# Patient Record
Sex: FEMALE | Race: WHITE | Employment: OTHER | URBAN - METROPOLITAN AREA
[De-identification: names, ages, dates, MRNs, and addresses within clinical notes are randomized per-mention and may not be internally consistent; named-entity substitution may affect disease eponyms.]

---

## 2017-07-26 PROBLEM — M25.562 CHRONIC PAIN OF BOTH KNEES: Status: ACTIVE | Noted: 2017-07-26

## 2017-07-26 PROBLEM — M25.561 CHRONIC PAIN OF BOTH KNEES: Status: ACTIVE | Noted: 2017-07-26

## 2017-07-26 PROBLEM — G89.29 CHRONIC PAIN OF BOTH KNEES: Status: ACTIVE | Noted: 2017-07-26

## 2017-07-26 PROBLEM — M25.552 LEFT HIP PAIN: Status: ACTIVE | Noted: 2017-07-26

## 2017-08-31 PROCEDURE — 83970 ASSAY OF PARATHORMONE: CPT | Performed by: FAMILY MEDICINE

## 2017-08-31 PROCEDURE — 82330 ASSAY OF CALCIUM: CPT | Performed by: FAMILY MEDICINE

## 2017-11-14 ENCOUNTER — HOSPITAL ENCOUNTER (OUTPATIENT)
Facility: HOSPITAL | Age: 67
Setting detail: HOSPITAL OUTPATIENT SURGERY
Discharge: HOME | End: 2017-11-14
Attending: SURGERY | Admitting: SURGERY
Payer: MEDICARE

## 2017-11-14 ENCOUNTER — ANESTHESIA (OUTPATIENT)
Dept: SURGERY | Facility: HOSPITAL | Age: 67
End: 2017-11-14
Payer: MEDICARE

## 2017-11-14 ENCOUNTER — SURGERY (OUTPATIENT)
Age: 67
End: 2017-11-14

## 2017-11-14 ENCOUNTER — ANESTHESIA EVENT (OUTPATIENT)
Dept: SURGERY | Facility: HOSPITAL | Age: 67
End: 2017-11-14
Payer: MEDICARE

## 2017-11-14 VITALS
DIASTOLIC BLOOD PRESSURE: 68 MMHG | RESPIRATION RATE: 20 BRPM | OXYGEN SATURATION: 98 % | SYSTOLIC BLOOD PRESSURE: 99 MMHG | WEIGHT: 139 LBS | HEIGHT: 63.5 IN | HEART RATE: 86 BPM | TEMPERATURE: 98 F | BODY MASS INDEX: 24.32 KG/M2

## 2017-11-14 PROCEDURE — 88305 TISSUE EXAM BY PATHOLOGIST: CPT | Performed by: SURGERY

## 2017-11-14 PROCEDURE — 83970 ASSAY OF PARATHORMONE: CPT | Performed by: SURGERY

## 2017-11-14 PROCEDURE — 0GBM0ZZ EXCISION OF LEFT SUPERIOR PARATHYROID GLAND, OPEN APPROACH: ICD-10-PCS | Performed by: SURGERY

## 2017-11-14 RX ORDER — SODIUM CHLORIDE, SODIUM LACTATE, POTASSIUM CHLORIDE, CALCIUM CHLORIDE 600; 310; 30; 20 MG/100ML; MG/100ML; MG/100ML; MG/100ML
INJECTION, SOLUTION INTRAVENOUS CONTINUOUS
Status: DISCONTINUED | OUTPATIENT
Start: 2017-11-14 | End: 2017-11-14

## 2017-11-14 RX ORDER — IPRATROPIUM BROMIDE 42 UG/1
1-2 SPRAY, METERED NASAL 2 TIMES DAILY
Status: ON HOLD | COMMUNITY
End: 2017-11-14

## 2017-11-14 RX ORDER — IBUPROFEN 800 MG/1
800 TABLET ORAL EVERY 8 HOURS PRN
Qty: 10 TABLET | Refills: 1 | Status: SHIPPED | OUTPATIENT
Start: 2017-11-14 | End: 2018-01-13

## 2017-11-14 RX ORDER — HYDROMORPHONE HYDROCHLORIDE 1 MG/ML
INJECTION, SOLUTION INTRAMUSCULAR; INTRAVENOUS; SUBCUTANEOUS
Status: COMPLETED
Start: 2017-11-14 | End: 2017-11-14

## 2017-11-14 RX ORDER — METOCLOPRAMIDE HYDROCHLORIDE 5 MG/ML
10 INJECTION INTRAMUSCULAR; INTRAVENOUS AS NEEDED
Status: DISCONTINUED | OUTPATIENT
Start: 2017-11-14 | End: 2017-11-14

## 2017-11-14 RX ORDER — HYDROCODONE BITARTRATE AND ACETAMINOPHEN 5; 325 MG/1; MG/1
1 TABLET ORAL AS NEEDED
Status: DISCONTINUED | OUTPATIENT
Start: 2017-11-14 | End: 2017-11-14

## 2017-11-14 RX ORDER — BUPIVACAINE HYDROCHLORIDE 5 MG/ML
INJECTION, SOLUTION EPIDURAL; INTRACAUDAL AS NEEDED
Status: DISCONTINUED | OUTPATIENT
Start: 2017-11-14 | End: 2017-11-14 | Stop reason: HOSPADM

## 2017-11-14 RX ORDER — ONDANSETRON 2 MG/ML
4 INJECTION INTRAMUSCULAR; INTRAVENOUS AS NEEDED
Status: DISCONTINUED | OUTPATIENT
Start: 2017-11-14 | End: 2017-11-14

## 2017-11-14 RX ORDER — FLUTICASONE PROPIONATE 50 MCG
1 SPRAY, SUSPENSION (ML) NASAL 2 TIMES DAILY
COMMUNITY
End: 2018-07-02

## 2017-11-14 RX ORDER — HYDROCODONE BITARTRATE AND ACETAMINOPHEN 5; 325 MG/1; MG/1
1-2 TABLET ORAL EVERY 4 HOURS PRN
Qty: 10 TABLET | Refills: 0 | Status: SHIPPED | OUTPATIENT
Start: 2017-11-14 | End: 2018-01-24 | Stop reason: ALTCHOICE

## 2017-11-14 RX ORDER — ACETAMINOPHEN 500 MG
1000 TABLET ORAL ONCE AS NEEDED
Status: DISCONTINUED | OUTPATIENT
Start: 2017-11-14 | End: 2017-11-14

## 2017-11-14 RX ORDER — HYDROMORPHONE HYDROCHLORIDE 1 MG/ML
0.4 INJECTION, SOLUTION INTRAMUSCULAR; INTRAVENOUS; SUBCUTANEOUS EVERY 5 MIN PRN
Status: DISCONTINUED | OUTPATIENT
Start: 2017-11-14 | End: 2017-11-14

## 2017-11-14 RX ORDER — MEPERIDINE HYDROCHLORIDE 25 MG/ML
12.5 INJECTION INTRAMUSCULAR; INTRAVENOUS; SUBCUTANEOUS AS NEEDED
Status: DISCONTINUED | OUTPATIENT
Start: 2017-11-14 | End: 2017-11-14

## 2017-11-14 RX ORDER — ALENDRONATE SODIUM 70 MG/1
70 TABLET ORAL WEEKLY
COMMUNITY
End: 2017-11-24

## 2017-11-14 RX ORDER — DEXTROSE, SODIUM CHLORIDE, SODIUM LACTATE, POTASSIUM CHLORIDE, AND CALCIUM CHLORIDE 5; .6; .31; .03; .02 G/100ML; G/100ML; G/100ML; G/100ML; G/100ML
INJECTION, SOLUTION INTRAVENOUS CONTINUOUS
Status: DISCONTINUED | OUTPATIENT
Start: 2017-11-14 | End: 2017-11-14

## 2017-11-14 RX ORDER — HYDROCODONE BITARTRATE AND ACETAMINOPHEN 5; 325 MG/1; MG/1
2 TABLET ORAL AS NEEDED
Status: DISCONTINUED | OUTPATIENT
Start: 2017-11-14 | End: 2017-11-14

## 2017-11-14 RX ORDER — NALOXONE HYDROCHLORIDE 0.4 MG/ML
80 INJECTION, SOLUTION INTRAMUSCULAR; INTRAVENOUS; SUBCUTANEOUS AS NEEDED
Status: DISCONTINUED | OUTPATIENT
Start: 2017-11-14 | End: 2017-11-14

## 2017-11-14 NOTE — H&P
[facility]  Report of history and physical    Trula Snooks Patient Status:  Outpatient in a Bed    1950 MRN [id number]   Location [facility] Attending Henrique Calvillo MD   Hosp Day # 0 PCP Ayush Flores MD     Reason for nausea, vomiting, constipation, diarrhea; no rectal bleeding; no heartburn  GENITAL/: no dysuria, urgency or frequency, no tea colored urine  MUSCULOSKELETAL: no joint complaints upper or lower extremities  HEMATOLOGY: denies hx anemia; denies bruising o mineral density of the left femoral neck is 0.635 g/cm2, with a T-score of -1.9  and a Z-score of -0.3.  This corresponds to [facility] classification of osteopenia.     The total average bone mineral density of the left hip is 0.692 g/cm2, with a T-score of -2.1

## 2017-11-14 NOTE — BRIEF OP NOTE
Pre-Operative Diagnosis: HYPERPARATHYROIDISM       Post-Operative Diagnosis: HYPERPARATHYROIDISM       Procedure Performed:   PARATHYROIDECTOMY WITH INTRA OPERATIVE ULTRASOUND AND INTACT PARATHYROID HORMONE ASSAY    Surgeon(s) and Role:     Mary Tubbs

## 2017-11-14 NOTE — ANESTHESIA POSTPROCEDURE EVALUATION
[address] Patient Status:  Outpatient in a Bed   Age/Gender 75year old female MRN [id number]   [facility] SURGERY Attending Elgin Carr MD   Hosp Day # 0 PCP Oumar Edward MD       Anesthesia Post-op Note    Proc

## 2017-11-14 NOTE — ANESTHESIA PREPROCEDURE EVALUATION
PRE-OP EVALUATION    Patient Name: Negar Gonzalez    Pre-op Diagnosis: HYPERPARATHYROIDISM      Procedure(s):  PARATHYROIDECTOMY WITH INTRA OPERATIVE ULTRASOUND AND INTACT PARATHYROID HORMONE ASSAY, NERVE MONITORING      Surgeon(s) and Role:     Jean Sprankle Mills, Drug use: No     Available pre-op labs reviewed. Airway      Mallampati: II  Mouth opening: 3 FB  TM distance: 4 - 6 cm  Neck ROM: full Cardiovascular    Cardiovascular exam normal.         Dental    No notable dental history.          P

## 2017-11-14 NOTE — OR NURSING
Pt has Dr. Donald Arguelles personal instruction sheet with the prescription attached for discharge from his office.

## 2017-11-15 NOTE — OPERATIVE REPORT
[address]    PATIENT'S NAME: Mamadou Law   ATTENDING PHYSICIAN: Phuc Dyer M.D. OPERATING PHYSICIAN: Phuc Dyer M.D.    PATIENT ACCOUNT#:   [de-identified]    LOCATION:  PACU [facility] PACU 1 [facility]  MEDICAL RECORD #:   [id number]       DATE OF BIRTH:  [birth year] After IV sedation and endotracheal intubation, the patient's arms were tucked at her side and a roll was positioned under the shoulder blade. The neck was slightly hyperextended. Then, ultrasound was used.   The area was scanned, and I was able to visuali 13:28:20  [facility] [id number]  TL/

## 2018-01-29 PROCEDURE — 86803 HEPATITIS C AB TEST: CPT | Performed by: FAMILY MEDICINE

## 2018-09-26 ENCOUNTER — ANESTHESIA EVENT (OUTPATIENT)
Dept: ENDOSCOPY | Facility: HOSPITAL | Age: 68
End: 2018-09-26

## 2018-09-26 ENCOUNTER — ANESTHESIA (OUTPATIENT)
Dept: ENDOSCOPY | Facility: HOSPITAL | Age: 68
End: 2018-09-26

## 2018-09-26 ENCOUNTER — HOSPITAL ENCOUNTER (OUTPATIENT)
Facility: HOSPITAL | Age: 68
Setting detail: HOSPITAL OUTPATIENT SURGERY
Discharge: HOME | End: 2018-09-26
Attending: INTERNAL MEDICINE | Admitting: INTERNAL MEDICINE
Payer: MEDICARE

## 2018-09-26 VITALS
WEIGHT: 138 LBS | HEART RATE: 91 BPM | TEMPERATURE: 99 F | BODY MASS INDEX: 24.15 KG/M2 | SYSTOLIC BLOOD PRESSURE: 110 MMHG | OXYGEN SATURATION: 94 % | HEIGHT: 63.5 IN | RESPIRATION RATE: 16 BRPM | DIASTOLIC BLOOD PRESSURE: 68 MMHG

## 2018-09-26 PROCEDURE — 07978ZX DRAINAGE OF THORAX LYMPHATIC, VIA NATURAL OR ARTIFICIAL OPENING ENDOSCOPIC APPROACH, DIAGNOSTIC: ICD-10-PCS | Performed by: INTERNAL MEDICINE

## 2018-09-26 PROCEDURE — 88341 IMHCHEM/IMCYTCHM EA ADD ANTB: CPT | Performed by: INTERNAL MEDICINE

## 2018-09-26 PROCEDURE — 88305 TISSUE EXAM BY PATHOLOGIST: CPT | Performed by: INTERNAL MEDICINE

## 2018-09-26 PROCEDURE — 0BB48ZX EXCISION OF RIGHT UPPER LOBE BRONCHUS, VIA NATURAL OR ARTIFICIAL OPENING ENDOSCOPIC, DIAGNOSTIC: ICD-10-PCS | Performed by: INTERNAL MEDICINE

## 2018-09-26 PROCEDURE — 88360 TUMOR IMMUNOHISTOCHEM/MANUAL: CPT | Performed by: INTERNAL MEDICINE

## 2018-09-26 PROCEDURE — 81210 BRAF GENE: CPT | Performed by: INTERNAL MEDICINE

## 2018-09-26 PROCEDURE — 88342 IMHCHEM/IMCYTCHM 1ST ANTB: CPT | Performed by: INTERNAL MEDICINE

## 2018-09-26 PROCEDURE — 81235 EGFR GENE COM VARIANTS: CPT | Performed by: INTERNAL MEDICINE

## 2018-09-26 PROCEDURE — 88173 CYTOPATH EVAL FNA REPORT: CPT | Performed by: INTERNAL MEDICINE

## 2018-09-26 PROCEDURE — 88172 CYTP DX EVAL FNA 1ST EA SITE: CPT | Performed by: INTERNAL MEDICINE

## 2018-09-26 PROCEDURE — 88381 MICRODISSECTION MANUAL: CPT | Performed by: INTERNAL MEDICINE

## 2018-09-26 RX ORDER — NALOXONE HYDROCHLORIDE 0.4 MG/ML
80 INJECTION, SOLUTION INTRAMUSCULAR; INTRAVENOUS; SUBCUTANEOUS AS NEEDED
Status: DISCONTINUED | OUTPATIENT
Start: 2018-09-26 | End: 2018-09-26 | Stop reason: HOSPADM

## 2018-09-26 RX ORDER — MEPERIDINE HYDROCHLORIDE 25 MG/ML
12.5 INJECTION INTRAMUSCULAR; INTRAVENOUS; SUBCUTANEOUS AS NEEDED
Status: DISCONTINUED | OUTPATIENT
Start: 2018-09-26 | End: 2018-09-26 | Stop reason: HOSPADM

## 2018-09-26 RX ORDER — ONDANSETRON 2 MG/ML
4 INJECTION INTRAMUSCULAR; INTRAVENOUS AS NEEDED
Status: DISCONTINUED | OUTPATIENT
Start: 2018-09-26 | End: 2018-09-26 | Stop reason: HOSPADM

## 2018-09-26 RX ORDER — SODIUM CHLORIDE, SODIUM LACTATE, POTASSIUM CHLORIDE, CALCIUM CHLORIDE 600; 310; 30; 20 MG/100ML; MG/100ML; MG/100ML; MG/100ML
INJECTION, SOLUTION INTRAVENOUS CONTINUOUS
Status: DISCONTINUED | OUTPATIENT
Start: 2018-09-26 | End: 2018-09-26

## 2018-09-26 RX ORDER — MIDAZOLAM HYDROCHLORIDE 1 MG/ML
1 INJECTION INTRAMUSCULAR; INTRAVENOUS EVERY 5 MIN PRN
Status: DISCONTINUED | OUTPATIENT
Start: 2018-09-26 | End: 2018-09-26 | Stop reason: HOSPADM

## 2018-09-26 RX ORDER — SODIUM CHLORIDE, SODIUM LACTATE, POTASSIUM CHLORIDE, CALCIUM CHLORIDE 600; 310; 30; 20 MG/100ML; MG/100ML; MG/100ML; MG/100ML
INJECTION, SOLUTION INTRAVENOUS CONTINUOUS
Status: DISCONTINUED | OUTPATIENT
Start: 2018-09-26 | End: 2018-09-26 | Stop reason: HOSPADM

## 2018-09-26 RX ORDER — METOCLOPRAMIDE HYDROCHLORIDE 5 MG/ML
10 INJECTION INTRAMUSCULAR; INTRAVENOUS AS NEEDED
Status: DISCONTINUED | OUTPATIENT
Start: 2018-09-26 | End: 2018-09-26 | Stop reason: HOSPADM

## 2018-09-26 NOTE — ANESTHESIA POSTPROCEDURE EVALUATION
[address] [facility] Patient Status:  Hospital Outpatient Surgery   Age/Gender 70year old female MRN [id number]   Location [address] [place] [address]. Attending Corry Lujan MD   Hosp Day # 0 PCP Norvel Lefort, MD       Anesthesia Post-op

## 2018-09-26 NOTE — OPERATIVE REPORT
EBUS Bronchoscopy Procedure Report     Preop diagnosis:        Abnormal CT scan, mediastinal adenopathy  Postop diagnosis:       Abnormal CT scan, mediastinal adenopathy    Bronchoscopist: Lisset Abdalla MD      Procedure(s) performed  1.  Standard bronc There was adequate hemostasis at the end of the case. The standard bronchoscope was withdrawn. The care of the patient was transferred to the anesthesiologist for reversal of anesthesia and extubation.       Patient tolerated the procedure well and was champagne

## 2018-09-26 NOTE — ANESTHESIA PREPROCEDURE EVALUATION
PRE-OP EVALUATION    Patient Name: Ml Reynolds    Pre-op Diagnosis: ABNORMAL CT CHEST    Procedure(s):  ENDOBRONCHIAL ULTRASOUND    Surgeon(s) and Role:     * Eli Desai MD - Primary    Pre-op vitals reviewed.   Temp: 98.4 °F (36.9 °C)  Pulse: IMPLANTATION;  Left      Comment:  Performed by Teo Tilley MD at [address] [place] [address]  11/2017: OTHER SURGICAL HISTORY      Comment:  parathyroidectomy  11/30/2016: RIGHT LASER-ASSISTED CATARACT SURGERY WITH ARCUATE   INCISIONS PHA

## 2018-09-26 NOTE — H&P
Preprocedure Note    Reviewed note by Dr. Omer Merlos dated 9/21/18. There has been no interval change. Plan is for bronch/EBUS under general anesthesia. Ashok Granados M.D.   Pulmonary/Critical Care

## 2018-10-02 PROBLEM — C34.11 MALIGNANT NEOPLASM OF UPPER LOBE OF RIGHT LUNG (HCC): Status: ACTIVE | Noted: 2018-10-02

## 2018-10-02 PROBLEM — C79.31 BRAIN METASTASIS (HCC): Status: ACTIVE | Noted: 2018-10-02

## 2019-01-01 ENCOUNTER — APPOINTMENT (OUTPATIENT)
Dept: CT IMAGING | Facility: HOSPITAL | Age: 69
DRG: 871 | End: 2019-01-01
Attending: NURSE PRACTITIONER
Payer: MEDICARE

## 2019-01-01 ENCOUNTER — APPOINTMENT (OUTPATIENT)
Dept: MRI IMAGING | Facility: HOSPITAL | Age: 69
DRG: 871 | End: 2019-01-01
Attending: Other
Payer: MEDICARE

## 2019-01-01 ENCOUNTER — APPOINTMENT (OUTPATIENT)
Dept: GENERAL RADIOLOGY | Facility: HOSPITAL | Age: 69
DRG: 871 | End: 2019-01-01
Attending: EMERGENCY MEDICINE
Payer: MEDICARE

## 2019-01-01 ENCOUNTER — APPOINTMENT (OUTPATIENT)
Dept: ULTRASOUND IMAGING | Facility: HOSPITAL | Age: 69
DRG: 871 | End: 2019-01-01
Attending: NURSE PRACTITIONER
Payer: MEDICARE

## 2019-01-01 ENCOUNTER — APPOINTMENT (OUTPATIENT)
Dept: CT IMAGING | Facility: HOSPITAL | Age: 69
DRG: 871 | End: 2019-01-01
Attending: EMERGENCY MEDICINE
Payer: MEDICARE

## 2019-01-01 ENCOUNTER — APPOINTMENT (OUTPATIENT)
Dept: GENERAL RADIOLOGY | Facility: HOSPITAL | Age: 69
DRG: 871 | End: 2019-01-01
Attending: NURSE PRACTITIONER
Payer: MEDICARE

## 2019-01-01 ENCOUNTER — APPOINTMENT (OUTPATIENT)
Dept: GENERAL RADIOLOGY | Facility: HOSPITAL | Age: 69
DRG: 871 | End: 2019-01-01
Attending: INTERNAL MEDICINE
Payer: MEDICARE

## 2019-01-01 ENCOUNTER — HOSPITAL ENCOUNTER (INPATIENT)
Facility: HOSPITAL | Age: 69
LOS: 3 days | Discharge: HOME | DRG: 871 | End: 2019-01-01
Attending: EMERGENCY MEDICINE | Admitting: HOSPITALIST
Payer: MEDICARE

## 2019-01-01 ENCOUNTER — APPOINTMENT (OUTPATIENT)
Dept: CV DIAGNOSTICS | Facility: HOSPITAL | Age: 69
DRG: 871 | End: 2019-01-01
Attending: Other
Payer: MEDICARE

## 2019-01-01 ENCOUNTER — APPOINTMENT (OUTPATIENT)
Dept: GENERAL RADIOLOGY | Facility: HOSPITAL | Age: 69
DRG: 175 | End: 2019-01-01
Attending: EMERGENCY MEDICINE
Payer: MEDICARE

## 2019-01-01 ENCOUNTER — HOSPITAL ENCOUNTER (INPATIENT)
Facility: HOSPITAL | Age: 69
LOS: 2 days | Discharge: HOME | DRG: 175 | End: 2019-01-01
Attending: EMERGENCY MEDICINE | Admitting: HOSPITALIST
Payer: MEDICARE

## 2019-01-01 ENCOUNTER — HOSPITAL ENCOUNTER (INPATIENT)
Facility: HOSPITAL | Age: 69
LOS: 2 days | Discharge: HOME | DRG: 871 | End: 2019-01-01
Attending: EMERGENCY MEDICINE | Admitting: INTERNAL MEDICINE
Payer: MEDICARE

## 2019-01-01 ENCOUNTER — APPOINTMENT (OUTPATIENT)
Dept: ULTRASOUND IMAGING | Age: 69
DRG: 871 | End: 2019-01-01
Attending: NURSE PRACTITIONER
Payer: MEDICARE

## 2019-01-01 ENCOUNTER — APPOINTMENT (OUTPATIENT)
Dept: CV DIAGNOSTICS | Facility: HOSPITAL | Age: 69
DRG: 175 | End: 2019-01-01
Attending: NURSE PRACTITIONER
Payer: MEDICARE

## 2019-01-01 ENCOUNTER — APPOINTMENT (OUTPATIENT)
Dept: CT IMAGING | Facility: HOSPITAL | Age: 69
DRG: 175 | End: 2019-01-01
Attending: EMERGENCY MEDICINE
Payer: MEDICARE

## 2019-01-01 ENCOUNTER — APPOINTMENT (OUTPATIENT)
Dept: ULTRASOUND IMAGING | Facility: HOSPITAL | Age: 69
DRG: 175 | End: 2019-01-01
Attending: NURSE PRACTITIONER
Payer: MEDICARE

## 2019-01-01 ENCOUNTER — HOSPITAL ENCOUNTER (INPATIENT)
Facility: HOSPITAL | Age: 69
LOS: 5 days | DRG: 871 | End: 2019-01-01
Attending: EMERGENCY MEDICINE | Admitting: HOSPITALIST
Payer: MEDICARE

## 2019-01-01 ENCOUNTER — HOSPITAL ENCOUNTER (EMERGENCY)
Facility: HOSPITAL | Age: 69
Discharge: HOME | End: 2019-01-01
Attending: EMERGENCY MEDICINE
Payer: MEDICARE

## 2019-01-01 VITALS
SYSTOLIC BLOOD PRESSURE: 109 MMHG | OXYGEN SATURATION: 92 % | DIASTOLIC BLOOD PRESSURE: 89 MMHG | WEIGHT: 138 LBS | BODY MASS INDEX: 24.45 KG/M2 | HEIGHT: 63 IN | RESPIRATION RATE: 20 BRPM | TEMPERATURE: 97 F | HEART RATE: 105 BPM

## 2019-01-01 VITALS
DIASTOLIC BLOOD PRESSURE: 67 MMHG | SYSTOLIC BLOOD PRESSURE: 120 MMHG | HEIGHT: 64 IN | BODY MASS INDEX: 25.11 KG/M2 | OXYGEN SATURATION: 98 % | HEART RATE: 103 BPM | TEMPERATURE: 98 F | WEIGHT: 147.06 LBS | RESPIRATION RATE: 25 BRPM

## 2019-01-01 VITALS
HEART RATE: 105 BPM | WEIGHT: 140 LBS | TEMPERATURE: 99 F | RESPIRATION RATE: 16 BRPM | HEIGHT: 63 IN | BODY MASS INDEX: 24.8 KG/M2 | OXYGEN SATURATION: 98 % | DIASTOLIC BLOOD PRESSURE: 67 MMHG | SYSTOLIC BLOOD PRESSURE: 107 MMHG

## 2019-01-01 VITALS
WEIGHT: 137.38 LBS | BODY MASS INDEX: 23.45 KG/M2 | HEIGHT: 64 IN | OXYGEN SATURATION: 89 % | DIASTOLIC BLOOD PRESSURE: 39 MMHG | TEMPERATURE: 97 F | SYSTOLIC BLOOD PRESSURE: 75 MMHG

## 2019-01-01 VITALS
HEART RATE: 93 BPM | BODY MASS INDEX: 26.05 KG/M2 | WEIGHT: 147 LBS | DIASTOLIC BLOOD PRESSURE: 84 MMHG | RESPIRATION RATE: 16 BRPM | SYSTOLIC BLOOD PRESSURE: 121 MMHG | TEMPERATURE: 98 F | OXYGEN SATURATION: 94 % | HEIGHT: 63 IN

## 2019-01-01 DIAGNOSIS — R65.21 SEPTIC SHOCK (HCC): ICD-10-CM

## 2019-01-01 DIAGNOSIS — I26.99 MULTIPLE PULMONARY EMBOLI (HCC): Primary | ICD-10-CM

## 2019-01-01 DIAGNOSIS — J18.9 PNEUMONIA DUE TO INFECTIOUS ORGANISM, UNSPECIFIED LATERALITY, UNSPECIFIED PART OF LUNG: ICD-10-CM

## 2019-01-01 DIAGNOSIS — C50.919 METASTATIC BREAST CANCER (HCC): ICD-10-CM

## 2019-01-01 DIAGNOSIS — N39.0 SEPSIS DUE TO URINARY TRACT INFECTION (HCC): Primary | ICD-10-CM

## 2019-01-01 DIAGNOSIS — R65.21 SEPTIC SHOCK (HCC): Primary | ICD-10-CM

## 2019-01-01 DIAGNOSIS — R57.9 SHOCK (HCC): ICD-10-CM

## 2019-01-01 DIAGNOSIS — R00.0 TACHYCARDIA: Primary | ICD-10-CM

## 2019-01-01 DIAGNOSIS — I95.9 SEPSIS WITH HYPOTENSION (HCC): ICD-10-CM

## 2019-01-01 DIAGNOSIS — A41.9 SEPSIS DUE TO URINARY TRACT INFECTION (HCC): Primary | ICD-10-CM

## 2019-01-01 DIAGNOSIS — A41.9 SEPTIC SHOCK (HCC): Primary | ICD-10-CM

## 2019-01-01 DIAGNOSIS — C34.11 MALIGNANT NEOPLASM OF UPPER LOBE OF RIGHT LUNG (HCC): ICD-10-CM

## 2019-01-01 DIAGNOSIS — A41.9 SEPTIC SHOCK (HCC): ICD-10-CM

## 2019-01-01 DIAGNOSIS — J18.9 COMMUNITY ACQUIRED PNEUMONIA OF RIGHT LUNG, UNSPECIFIED PART OF LUNG: ICD-10-CM

## 2019-01-01 DIAGNOSIS — C79.31 BRAIN METASTASIS (HCC): ICD-10-CM

## 2019-01-01 DIAGNOSIS — E86.0 DEHYDRATION: ICD-10-CM

## 2019-01-01 DIAGNOSIS — A41.9 SEPSIS WITH HYPOTENSION (HCC): ICD-10-CM

## 2019-01-01 DIAGNOSIS — R41.82 ALTERED MENTAL STATUS, UNSPECIFIED ALTERED MENTAL STATUS TYPE: ICD-10-CM

## 2019-01-01 DIAGNOSIS — R09.02 HYPOXIA: ICD-10-CM

## 2019-01-01 DIAGNOSIS — J18.9 COMMUNITY ACQUIRED PNEUMONIA OF RIGHT LUNG, UNSPECIFIED PART OF LUNG: Primary | ICD-10-CM

## 2019-01-01 LAB
ALBUMIN SERPL-MCNC: 3.1 G/DL (ref 3.4–5)
ALBUMIN/GLOB SERPL: 0.8 {RATIO} (ref 1–2)
ALP LIVER SERPL-CCNC: 73 U/L (ref 55–142)
ALT SERPL-CCNC: 28 U/L (ref 13–56)
ANION GAP SERPL CALC-SCNC: 5 MMOL/L (ref 0–18)
ANION GAP SERPL CALC-SCNC: 5 MMOL/L (ref 0–18)
ANION GAP SERPL CALC-SCNC: 9 MMOL/L (ref 0–18)
AST SERPL-CCNC: 15 U/L (ref 15–37)
BASOPHILS # BLD AUTO: 0.02 X10(3) UL (ref 0–0.2)
BASOPHILS # BLD AUTO: 0.02 X10(3) UL (ref 0–0.2)
BASOPHILS # BLD AUTO: 0.04 X10(3) UL (ref 0–0.2)
BASOPHILS NFR BLD AUTO: 0.2 %
BASOPHILS NFR BLD AUTO: 0.2 %
BASOPHILS NFR BLD AUTO: 0.3 %
BILIRUB SERPL-MCNC: 0.3 MG/DL (ref 0.1–2)
BUN BLD-MCNC: 10 MG/DL (ref 7–18)
BUN BLD-MCNC: 11 MG/DL (ref 7–18)
BUN BLD-MCNC: 19 MG/DL (ref 7–18)
BUN/CREAT SERPL: 11.9 (ref 10–20)
BUN/CREAT SERPL: 15.1 (ref 10–20)
BUN/CREAT SERPL: 15.3 (ref 10–20)
C DIFF TOX B STL QL: NEGATIVE
CALCIUM BLD-MCNC: 8.3 MG/DL (ref 8.5–10.1)
CALCIUM BLD-MCNC: 8.8 MG/DL (ref 8.5–10.1)
CALCIUM BLD-MCNC: 9.4 MG/DL (ref 8.5–10.1)
CHLORIDE SERPL-SCNC: 102 MMOL/L (ref 98–112)
CHLORIDE SERPL-SCNC: 108 MMOL/L (ref 98–112)
CHLORIDE SERPL-SCNC: 109 MMOL/L (ref 98–112)
CO2 SERPL-SCNC: 22 MMOL/L (ref 21–32)
CO2 SERPL-SCNC: 25 MMOL/L (ref 21–32)
CO2 SERPL-SCNC: 27 MMOL/L (ref 21–32)
CREAT BLD-MCNC: 0.72 MG/DL (ref 0.55–1.02)
CREAT BLD-MCNC: 0.84 MG/DL (ref 0.55–1.02)
CREAT BLD-MCNC: 1.26 MG/DL (ref 0.55–1.02)
DEPRECATED RDW RBC AUTO: 51 FL (ref 35.1–46.3)
DEPRECATED RDW RBC AUTO: 51.7 FL (ref 35.1–46.3)
DEPRECATED RDW RBC AUTO: 51.9 FL (ref 35.1–46.3)
EOSINOPHIL # BLD AUTO: 0.01 X10(3) UL (ref 0–0.7)
EOSINOPHIL # BLD AUTO: 0.04 X10(3) UL (ref 0–0.7)
EOSINOPHIL # BLD AUTO: 0.05 X10(3) UL (ref 0–0.7)
EOSINOPHIL NFR BLD AUTO: 0.1 %
EOSINOPHIL NFR BLD AUTO: 0.4 %
EOSINOPHIL NFR BLD AUTO: 0.6 %
ERYTHROCYTE [DISTWIDTH] IN BLOOD BY AUTOMATED COUNT: 15.2 % (ref 11–15)
ERYTHROCYTE [DISTWIDTH] IN BLOOD BY AUTOMATED COUNT: 15.3 % (ref 11–15)
ERYTHROCYTE [DISTWIDTH] IN BLOOD BY AUTOMATED COUNT: 15.4 % (ref 11–15)
GLOBULIN PLAS-MCNC: 3.9 G/DL (ref 2.8–4.4)
GLUCOSE BLD-MCNC: 122 MG/DL (ref 70–99)
GLUCOSE BLD-MCNC: 91 MG/DL (ref 70–99)
GLUCOSE BLD-MCNC: 96 MG/DL (ref 70–99)
GLUCOSE UR STRIP.AUTO-MCNC: NEGATIVE MG/DL
HAV IGM SER QL: 2 MG/DL (ref 1.6–2.6)
HCT VFR BLD AUTO: 35 % (ref 35–48)
HCT VFR BLD AUTO: 35.6 % (ref 35–48)
HCT VFR BLD AUTO: 42.1 % (ref 35–48)
HGB BLD-MCNC: 11.5 G/DL (ref 12–16)
HGB BLD-MCNC: 11.6 G/DL (ref 12–16)
HGB BLD-MCNC: 13.7 G/DL (ref 12–16)
IMM GRANULOCYTES # BLD AUTO: 0.07 X10(3) UL (ref 0–1)
IMM GRANULOCYTES # BLD AUTO: 0.07 X10(3) UL (ref 0–1)
IMM GRANULOCYTES # BLD AUTO: 0.08 X10(3) UL (ref 0–1)
IMM GRANULOCYTES NFR BLD: 0.5 %
IMM GRANULOCYTES NFR BLD: 0.8 %
IMM GRANULOCYTES NFR BLD: 0.8 %
INR BLD: 1.11 (ref 0.9–1.1)
LACTATE SERPL-SCNC: 1.4 MMOL/L (ref 0.4–2)
LYMPHOCYTES # BLD AUTO: 0.63 X10(3) UL (ref 1–4)
LYMPHOCYTES # BLD AUTO: 0.75 X10(3) UL (ref 1–4)
LYMPHOCYTES # BLD AUTO: 0.84 X10(3) UL (ref 1–4)
LYMPHOCYTES NFR BLD AUTO: 4.4 %
LYMPHOCYTES NFR BLD AUTO: 8.2 %
LYMPHOCYTES NFR BLD AUTO: 8.8 %
M PROTEIN MFR SERPL ELPH: 7 G/DL (ref 6.4–8.2)
MCH RBC QN AUTO: 29.7 PG (ref 26–34)
MCH RBC QN AUTO: 29.8 PG (ref 26–34)
MCH RBC QN AUTO: 30.2 PG (ref 26–34)
MCHC RBC AUTO-ENTMCNC: 32.5 G/DL (ref 31–37)
MCHC RBC AUTO-ENTMCNC: 32.6 G/DL (ref 31–37)
MCHC RBC AUTO-ENTMCNC: 32.9 G/DL (ref 31–37)
MCV RBC AUTO: 91.3 FL (ref 80–100)
MCV RBC AUTO: 91.5 FL (ref 80–100)
MCV RBC AUTO: 91.9 FL (ref 80–100)
MONOCYTES # BLD AUTO: 0.61 X10(3) UL (ref 0.1–1)
MONOCYTES # BLD AUTO: 0.67 X10(3) UL (ref 0.1–1)
MONOCYTES # BLD AUTO: 0.86 X10(3) UL (ref 0.1–1)
MONOCYTES NFR BLD AUTO: 6 %
MONOCYTES NFR BLD AUTO: 6.5 %
MONOCYTES NFR BLD AUTO: 7.2 %
NEUTROPHILS # BLD AUTO: 12.62 X10 (3) UL (ref 1.5–7.7)
NEUTROPHILS # BLD AUTO: 12.62 X10(3) UL (ref 1.5–7.7)
NEUTROPHILS # BLD AUTO: 7.01 X10 (3) UL (ref 1.5–7.7)
NEUTROPHILS # BLD AUTO: 7.01 X10(3) UL (ref 1.5–7.7)
NEUTROPHILS # BLD AUTO: 8.62 X10 (3) UL (ref 1.5–7.7)
NEUTROPHILS # BLD AUTO: 8.62 X10(3) UL (ref 1.5–7.7)
NEUTROPHILS NFR BLD AUTO: 82.4 %
NEUTROPHILS NFR BLD AUTO: 83.9 %
NEUTROPHILS NFR BLD AUTO: 88.7 %
NITRITE UR QL STRIP.AUTO: POSITIVE
OSMOLALITY SERPL CALC.SUM OF ELEC: 280 MOSM/KG (ref 275–295)
OSMOLALITY SERPL CALC.SUM OF ELEC: 287 MOSM/KG (ref 275–295)
OSMOLALITY SERPL CALC.SUM OF ELEC: 289 MOSM/KG (ref 275–295)
PH UR STRIP.AUTO: 6 [PH] (ref 4.5–8)
PHOSPHATE SERPL-MCNC: 3.1 MG/DL (ref 2.5–4.9)
PLATELET # BLD AUTO: 203 10(3)UL (ref 150–450)
PLATELET # BLD AUTO: 216 10(3)UL (ref 150–450)
PLATELET # BLD AUTO: 240 10(3)UL (ref 150–450)
POTASSIUM SERPL-SCNC: 3.4 MMOL/L (ref 3.5–5.1)
POTASSIUM SERPL-SCNC: 3.7 MMOL/L (ref 3.5–5.1)
POTASSIUM SERPL-SCNC: 4.2 MMOL/L (ref 3.5–5.1)
PROT UR STRIP.AUTO-MCNC: >=300 MG/DL
PSA SERPL DL<=0.01 NG/ML-MCNC: 14.8 SECONDS (ref 12.5–14.7)
RBC # BLD AUTO: 3.81 X10(6)UL (ref 3.8–5.3)
RBC # BLD AUTO: 3.9 X10(6)UL (ref 3.8–5.3)
RBC # BLD AUTO: 4.6 X10(6)UL (ref 3.8–5.3)
RBC #/AREA URNS AUTO: >10 /HPF
SODIUM SERPL-SCNC: 133 MMOL/L (ref 136–145)
SODIUM SERPL-SCNC: 139 MMOL/L (ref 136–145)
SODIUM SERPL-SCNC: 140 MMOL/L (ref 136–145)
SP GR UR STRIP.AUTO: 1.02 (ref 1–1.03)
UROBILINOGEN UR STRIP.AUTO-MCNC: 1 MG/DL
WBC # BLD AUTO: 10.3 X10(3) UL (ref 4–11)
WBC # BLD AUTO: 14.2 X10(3) UL (ref 4–11)
WBC # BLD AUTO: 8.5 X10(3) UL (ref 4–11)
WBC #/AREA URNS AUTO: >50 /HPF
WBC CLUMPS UR QL AUTO: PRESENT

## 2019-01-01 PROCEDURE — 85730 THROMBOPLASTIN TIME PARTIAL: CPT | Performed by: EMERGENCY MEDICINE

## 2019-01-01 PROCEDURE — 80048 BASIC METABOLIC PNL TOTAL CA: CPT | Performed by: INTERNAL MEDICINE

## 2019-01-01 PROCEDURE — 70450 CT HEAD/BRAIN W/O DYE: CPT | Performed by: EMERGENCY MEDICINE

## 2019-01-01 PROCEDURE — 71045 X-RAY EXAM CHEST 1 VIEW: CPT | Performed by: INTERNAL MEDICINE

## 2019-01-01 PROCEDURE — 85027 COMPLETE CBC AUTOMATED: CPT | Performed by: INTERNAL MEDICINE

## 2019-01-01 PROCEDURE — 96375 TX/PRO/DX INJ NEW DRUG ADDON: CPT | Performed by: EMERGENCY MEDICINE

## 2019-01-01 PROCEDURE — 85025 COMPLETE CBC W/AUTO DIFF WBC: CPT | Performed by: INTERNAL MEDICINE

## 2019-01-01 PROCEDURE — 93306 TTE W/DOPPLER COMPLETE: CPT | Performed by: NURSE PRACTITIONER

## 2019-01-01 PROCEDURE — 87999 UNLISTED MICROBIOLOGY PX: CPT

## 2019-01-01 PROCEDURE — 99291 CRITICAL CARE FIRST HOUR: CPT | Performed by: EMERGENCY MEDICINE

## 2019-01-01 PROCEDURE — 96365 THER/PROPH/DIAG IV INF INIT: CPT

## 2019-01-01 PROCEDURE — 96360 HYDRATION IV INFUSION INIT: CPT

## 2019-01-01 PROCEDURE — 93880 EXTRACRANIAL BILAT STUDY: CPT | Performed by: NURSE PRACTITIONER

## 2019-01-01 PROCEDURE — 74176 CT ABD & PELVIS W/O CONTRAST: CPT | Performed by: EMERGENCY MEDICINE

## 2019-01-01 PROCEDURE — 71045 X-RAY EXAM CHEST 1 VIEW: CPT | Performed by: EMERGENCY MEDICINE

## 2019-01-01 PROCEDURE — 87186 SC STD MICRODIL/AGAR DIL: CPT | Performed by: EMERGENCY MEDICINE

## 2019-01-01 PROCEDURE — 93005 ELECTROCARDIOGRAM TRACING: CPT

## 2019-01-01 PROCEDURE — 93970 EXTREMITY STUDY: CPT | Performed by: NURSE PRACTITIONER

## 2019-01-01 PROCEDURE — 99291 CRITICAL CARE FIRST HOUR: CPT

## 2019-01-01 PROCEDURE — 99223 1ST HOSP IP/OBS HIGH 75: CPT | Performed by: NURSE PRACTITIONER

## 2019-01-01 PROCEDURE — 5A09357 ASSISTANCE WITH RESPIRATORY VENTILATION, LESS THAN 24 CONSECUTIVE HOURS, CONTINUOUS POSITIVE AIRWAY PRESSURE: ICD-10-PCS | Performed by: INTERNAL MEDICINE

## 2019-01-01 PROCEDURE — 85610 PROTHROMBIN TIME: CPT | Performed by: EMERGENCY MEDICINE

## 2019-01-01 PROCEDURE — 83735 ASSAY OF MAGNESIUM: CPT | Performed by: INTERNAL MEDICINE

## 2019-01-01 PROCEDURE — 81001 URINALYSIS AUTO W/SCOPE: CPT | Performed by: NURSE PRACTITIONER

## 2019-01-01 PROCEDURE — 71045 X-RAY EXAM CHEST 1 VIEW: CPT | Performed by: NURSE PRACTITIONER

## 2019-01-01 PROCEDURE — 36415 COLL VENOUS BLD VENIPUNCTURE: CPT | Performed by: EMERGENCY MEDICINE

## 2019-01-01 PROCEDURE — 70551 MRI BRAIN STEM W/O DYE: CPT | Performed by: OTHER

## 2019-01-01 PROCEDURE — 84145 PROCALCITONIN (PCT): CPT | Performed by: EMERGENCY MEDICINE

## 2019-01-01 PROCEDURE — 85610 PROTHROMBIN TIME: CPT | Performed by: INTERNAL MEDICINE

## 2019-01-01 PROCEDURE — 87081 CULTURE SCREEN ONLY: CPT | Performed by: INTERNAL MEDICINE

## 2019-01-01 PROCEDURE — 85025 COMPLETE CBC W/AUTO DIFF WBC: CPT | Performed by: EMERGENCY MEDICINE

## 2019-01-01 PROCEDURE — 81001 URINALYSIS AUTO W/SCOPE: CPT | Performed by: EMERGENCY MEDICINE

## 2019-01-01 PROCEDURE — 87581 M.PNEUMON DNA AMP PROBE: CPT | Performed by: EMERGENCY MEDICINE

## 2019-01-01 PROCEDURE — 96365 THER/PROPH/DIAG IV INF INIT: CPT | Performed by: EMERGENCY MEDICINE

## 2019-01-01 PROCEDURE — 87040 BLOOD CULTURE FOR BACTERIA: CPT | Performed by: EMERGENCY MEDICINE

## 2019-01-01 PROCEDURE — 87086 URINE CULTURE/COLONY COUNT: CPT | Performed by: FAMILY MEDICINE

## 2019-01-01 PROCEDURE — 83605 ASSAY OF LACTIC ACID: CPT | Performed by: EMERGENCY MEDICINE

## 2019-01-01 PROCEDURE — 80053 COMPREHEN METABOLIC PANEL: CPT | Performed by: EMERGENCY MEDICINE

## 2019-01-01 PROCEDURE — 87081 CULTURE SCREEN ONLY: CPT | Performed by: NURSE PRACTITIONER

## 2019-01-01 PROCEDURE — 99284 EMERGENCY DEPT VISIT MOD MDM: CPT

## 2019-01-01 PROCEDURE — 87486 CHLMYD PNEUM DNA AMP PROBE: CPT | Performed by: EMERGENCY MEDICINE

## 2019-01-01 PROCEDURE — 87077 CULTURE AEROBIC IDENTIFY: CPT | Performed by: EMERGENCY MEDICINE

## 2019-01-01 PROCEDURE — 71275 CT ANGIOGRAPHY CHEST: CPT | Performed by: EMERGENCY MEDICINE

## 2019-01-01 PROCEDURE — 81003 URINALYSIS AUTO W/O SCOPE: CPT | Performed by: FAMILY MEDICINE

## 2019-01-01 PROCEDURE — 96361 HYDRATE IV INFUSION ADD-ON: CPT

## 2019-01-01 PROCEDURE — 82803 BLOOD GASES ANY COMBINATION: CPT | Performed by: EMERGENCY MEDICINE

## 2019-01-01 PROCEDURE — 30233R1 TRANSFUSION OF NONAUTOLOGOUS PLATELETS INTO PERIPHERAL VEIN, PERCUTANEOUS APPROACH: ICD-10-PCS | Performed by: INTERNAL MEDICINE

## 2019-01-01 PROCEDURE — 70450 CT HEAD/BRAIN W/O DYE: CPT | Performed by: NURSE PRACTITIONER

## 2019-01-01 PROCEDURE — 87633 RESP VIRUS 12-25 TARGETS: CPT | Performed by: EMERGENCY MEDICINE

## 2019-01-01 PROCEDURE — 96361 HYDRATE IV INFUSION ADD-ON: CPT | Performed by: EMERGENCY MEDICINE

## 2019-01-01 PROCEDURE — 51701 INSERT BLADDER CATHETER: CPT | Performed by: NURSE PRACTITIONER

## 2019-01-01 PROCEDURE — 93306 TTE W/DOPPLER COMPLETE: CPT | Performed by: OTHER

## 2019-01-01 PROCEDURE — 36415 COLL VENOUS BLD VENIPUNCTURE: CPT

## 2019-01-01 PROCEDURE — 94667 MNPJ CHEST WALL 1ST: CPT

## 2019-01-01 PROCEDURE — 87798 DETECT AGENT NOS DNA AMP: CPT | Performed by: EMERGENCY MEDICINE

## 2019-01-01 PROCEDURE — 84100 ASSAY OF PHOSPHORUS: CPT | Performed by: INTERNAL MEDICINE

## 2019-01-01 PROCEDURE — 87493 C DIFF AMPLIFIED PROBE: CPT | Performed by: INTERNAL MEDICINE

## 2019-01-01 PROCEDURE — 82962 GLUCOSE BLOOD TEST: CPT

## 2019-01-01 PROCEDURE — 93010 ELECTROCARDIOGRAM REPORT: CPT | Performed by: EMERGENCY MEDICINE

## 2019-01-01 PROCEDURE — 3E033XZ INTRODUCTION OF VASOPRESSOR INTO PERIPHERAL VEIN, PERCUTANEOUS APPROACH: ICD-10-PCS | Performed by: INTERNAL MEDICINE

## 2019-01-01 PROCEDURE — 87086 URINE CULTURE/COLONY COUNT: CPT | Performed by: EMERGENCY MEDICINE

## 2019-01-01 PROCEDURE — 96367 TX/PROPH/DG ADDL SEQ IV INF: CPT | Performed by: EMERGENCY MEDICINE

## 2019-01-01 PROCEDURE — 99291 CRITICAL CARE FIRST HOUR: CPT | Performed by: NURSE PRACTITIONER

## 2019-01-01 RX ORDER — ONDANSETRON 2 MG/ML
4 INJECTION INTRAMUSCULAR; INTRAVENOUS EVERY 6 HOURS PRN
Status: DISCONTINUED | OUTPATIENT
Start: 2019-01-01 | End: 2019-01-01

## 2019-01-01 RX ORDER — DEXAMETHASONE 2 MG/1
4 TABLET ORAL DAILY
Refills: 0 | Status: SHIPPED | COMMUNITY
Start: 2019-01-01 | End: 2019-01-01

## 2019-01-01 RX ORDER — METOCLOPRAMIDE HYDROCHLORIDE 5 MG/ML
10 INJECTION INTRAMUSCULAR; INTRAVENOUS EVERY 6 HOURS PRN
Status: DISCONTINUED | OUTPATIENT
Start: 2019-01-01 | End: 2019-01-01

## 2019-01-01 RX ORDER — ACETAMINOPHEN 325 MG/1
650 TABLET ORAL EVERY 6 HOURS PRN
Status: DISCONTINUED | OUTPATIENT
Start: 2019-01-01 | End: 2019-01-01

## 2019-01-01 RX ORDER — SODIUM CHLORIDE 9 MG/ML
INJECTION, SOLUTION INTRAVENOUS CONTINUOUS
Status: DISCONTINUED | OUTPATIENT
Start: 2019-01-01 | End: 2019-01-01

## 2019-01-01 RX ORDER — PHENYLEPHRINE HCL IN 0.9% NACL 50MG/250ML
PLASTIC BAG, INJECTION (ML) INTRAVENOUS CONTINUOUS
Status: DISCONTINUED | OUTPATIENT
Start: 2019-01-01 | End: 2019-01-01

## 2019-01-01 RX ORDER — ENOXAPARIN SODIUM 100 MG/ML
1 INJECTION SUBCUTANEOUS EVERY 12 HOURS
Status: DISCONTINUED | OUTPATIENT
Start: 2019-01-01 | End: 2019-01-01

## 2019-01-01 RX ORDER — MAGNESIUM OXIDE 400 MG (241.3 MG MAGNESIUM) TABLET
1 TABLET NIGHTLY
Status: DISCONTINUED | OUTPATIENT
Start: 2019-01-01 | End: 2019-01-01

## 2019-01-01 RX ORDER — SODIUM CHLORIDE, SODIUM LACTATE, POTASSIUM CHLORIDE, CALCIUM CHLORIDE 600; 310; 30; 20 MG/100ML; MG/100ML; MG/100ML; MG/100ML
INJECTION, SOLUTION INTRAVENOUS CONTINUOUS
Status: DISCONTINUED | OUTPATIENT
Start: 2019-01-01 | End: 2019-01-01

## 2019-01-01 RX ORDER — DEXAMETHASONE 2 MG/1
4 TABLET ORAL 2 TIMES DAILY WITH MEALS
Qty: 90 TABLET | Refills: 0 | Status: SHIPPED | OUTPATIENT
Start: 2019-01-01 | End: 2019-01-01

## 2019-01-01 RX ORDER — METRONIDAZOLE 500 MG/100ML
500 INJECTION, SOLUTION INTRAVENOUS EVERY 8 HOURS
Status: DISCONTINUED | OUTPATIENT
Start: 2019-01-01 | End: 2019-01-01

## 2019-01-01 RX ORDER — TRAMADOL HYDROCHLORIDE 50 MG/1
50 TABLET ORAL EVERY 6 HOURS PRN
Status: DISCONTINUED | OUTPATIENT
Start: 2019-01-01 | End: 2019-01-01

## 2019-01-01 RX ORDER — DEXAMETHASONE 2 MG/1
4 TABLET ORAL 2 TIMES DAILY WITH MEALS
Qty: 24 TABLET | Refills: 0 | Status: SHIPPED | OUTPATIENT
Start: 2019-01-01 | End: 2019-01-01

## 2019-01-01 RX ORDER — CEFDINIR 300 MG/1
300 CAPSULE ORAL 2 TIMES DAILY
Qty: 18 CAPSULE | Refills: 0 | Status: SHIPPED | OUTPATIENT
Start: 2019-01-01 | End: 2019-01-01

## 2019-01-01 RX ORDER — MORPHINE SULFATE 4 MG/ML
1 INJECTION, SOLUTION INTRAMUSCULAR; INTRAVENOUS EVERY 2 HOUR PRN
Status: DISCONTINUED | OUTPATIENT
Start: 2019-01-01 | End: 2019-01-01

## 2019-01-01 RX ORDER — MELATONIN
800 DAILY
Status: DISCONTINUED | OUTPATIENT
Start: 2019-01-01 | End: 2019-01-01

## 2019-01-01 RX ORDER — DEXAMETHASONE SODIUM PHOSPHATE 4 MG/ML
4 VIAL (ML) INJECTION EVERY 12 HOURS
Status: DISCONTINUED | OUTPATIENT
Start: 2019-01-01 | End: 2019-01-01

## 2019-01-01 RX ORDER — CEFUROXIME AXETIL 500 MG/1
500 TABLET ORAL EVERY 12 HOURS SCHEDULED
Status: DISCONTINUED | OUTPATIENT
Start: 2019-01-01 | End: 2019-01-01

## 2019-01-01 RX ORDER — ATORVASTATIN CALCIUM 40 MG/1
80 TABLET, FILM COATED ORAL NIGHTLY
Status: DISCONTINUED | OUTPATIENT
Start: 2019-01-01 | End: 2019-01-01

## 2019-01-01 RX ORDER — ENOXAPARIN SODIUM 100 MG/ML
0.7 INJECTION SUBCUTANEOUS EVERY 12 HOURS
Status: DISCONTINUED | OUTPATIENT
Start: 2019-01-01 | End: 2019-01-01

## 2019-01-01 RX ORDER — SULFAMETHOXAZOLE AND TRIMETHOPRIM 800; 160 MG/1; MG/1
1 TABLET ORAL
Status: DISCONTINUED | OUTPATIENT
Start: 2019-01-01 | End: 2019-01-01

## 2019-01-01 RX ORDER — LEVOFLOXACIN 250 MG/1
250 TABLET ORAL DAILY
Status: ON HOLD | COMMUNITY
End: 2019-01-01

## 2019-01-01 RX ORDER — HEPARIN SODIUM AND DEXTROSE 10000; 5 [USP'U]/100ML; G/100ML
18 INJECTION INTRAVENOUS ONCE
Status: COMPLETED | OUTPATIENT
Start: 2019-01-01 | End: 2019-01-01

## 2019-01-01 RX ORDER — SCOLOPAMINE TRANSDERMAL SYSTEM 1 MG/1
1 PATCH, EXTENDED RELEASE TRANSDERMAL
Status: DISCONTINUED | OUTPATIENT
Start: 2019-01-01 | End: 2019-01-01

## 2019-01-01 RX ORDER — ACETAMINOPHEN 500 MG
1000 TABLET ORAL ONCE
Status: COMPLETED | OUTPATIENT
Start: 2019-01-01 | End: 2019-01-01

## 2019-01-01 RX ORDER — FUROSEMIDE 10 MG/ML
20 INJECTION INTRAMUSCULAR; INTRAVENOUS ONCE
Status: COMPLETED | OUTPATIENT
Start: 2019-01-01 | End: 2019-01-01

## 2019-01-01 RX ORDER — SODIUM PHOSPHATE, DIBASIC AND SODIUM PHOSPHATE, MONOBASIC 7; 19 G/133ML; G/133ML
1 ENEMA RECTAL ONCE AS NEEDED
Status: DISCONTINUED | OUTPATIENT
Start: 2019-01-01 | End: 2019-01-01

## 2019-01-01 RX ORDER — SULFAMETHOXAZOLE AND TRIMETHOPRIM 800; 160 MG/1; MG/1
1 TABLET ORAL
Qty: 36 TABLET | Refills: 0 | Status: SHIPPED | OUTPATIENT
Start: 2019-01-01 | End: 2019-01-01

## 2019-01-01 RX ORDER — POTASSIUM CHLORIDE 20 MEQ/1
40 TABLET, EXTENDED RELEASE ORAL EVERY 4 HOURS
Status: COMPLETED | OUTPATIENT
Start: 2019-01-01 | End: 2019-01-01

## 2019-01-01 RX ORDER — MORPHINE SULFATE 4 MG/ML
2 INJECTION, SOLUTION INTRAMUSCULAR; INTRAVENOUS EVERY 2 HOUR PRN
Status: DISCONTINUED | OUTPATIENT
Start: 2019-01-01 | End: 2019-01-01

## 2019-01-01 RX ORDER — ACETAMINOPHEN 325 MG/1
650 TABLET ORAL EVERY 4 HOURS PRN
Status: DISCONTINUED | OUTPATIENT
Start: 2019-01-01 | End: 2019-01-01

## 2019-01-01 RX ORDER — SODIUM CHLORIDE 9 MG/ML
INJECTION, SOLUTION INTRAVENOUS ONCE
Status: COMPLETED | OUTPATIENT
Start: 2019-01-01 | End: 2019-01-01

## 2019-01-01 RX ORDER — MELATONIN
400 DAILY
COMMUNITY

## 2019-01-01 RX ORDER — ONDANSETRON HYDROCHLORIDE 8 MG/1
8 TABLET, FILM COATED ORAL EVERY 8 HOURS PRN
COMMUNITY

## 2019-01-01 RX ORDER — METRONIDAZOLE 500 MG/100ML
500 INJECTION, SOLUTION INTRAVENOUS ONCE
Status: COMPLETED | OUTPATIENT
Start: 2019-01-01 | End: 2019-01-01

## 2019-01-01 RX ORDER — METOCLOPRAMIDE 10 MG/1
10 TABLET ORAL EVERY 6 HOURS PRN
Status: DISCONTINUED | OUTPATIENT
Start: 2019-01-01 | End: 2019-01-01

## 2019-01-01 RX ORDER — DEXTROSE AND SODIUM CHLORIDE 5; .45 G/100ML; G/100ML
INJECTION, SOLUTION INTRAVENOUS CONTINUOUS
Status: CANCELLED | OUTPATIENT
Start: 2019-01-01 | End: 2019-01-01

## 2019-01-01 RX ORDER — DOPAMINE HYDROCHLORIDE 320 MG/100ML
INJECTION, SOLUTION INTRAVENOUS CONTINUOUS
Status: DISCONTINUED | OUTPATIENT
Start: 2019-01-01 | End: 2019-01-01

## 2019-01-01 RX ORDER — ENOXAPARIN SODIUM 100 MG/ML
1 INJECTION SUBCUTANEOUS EVERY 12 HOURS SCHEDULED
Status: DISCONTINUED | OUTPATIENT
Start: 2019-01-01 | End: 2019-01-01

## 2019-01-01 RX ORDER — ZOLPIDEM TARTRATE 5 MG/1
5 TABLET ORAL NIGHTLY
Status: DISCONTINUED | OUTPATIENT
Start: 2019-01-01 | End: 2019-01-01

## 2019-01-01 RX ORDER — DEXAMETHASONE SODIUM PHOSPHATE 4 MG/ML
10 VIAL (ML) INJECTION ONCE
Status: COMPLETED | OUTPATIENT
Start: 2019-01-01 | End: 2019-01-01

## 2019-01-01 RX ORDER — LABETALOL HYDROCHLORIDE 5 MG/ML
10 INJECTION, SOLUTION INTRAVENOUS EVERY 10 MIN PRN
Status: DISCONTINUED | OUTPATIENT
Start: 2019-01-01 | End: 2019-01-01

## 2019-01-01 RX ORDER — ZOLPIDEM TARTRATE 10 MG/1
10 TABLET ORAL NIGHTLY
COMMUNITY

## 2019-01-01 RX ORDER — ALBUMIN, HUMAN INJ 5% 5 %
500 SOLUTION INTRAVENOUS ONCE
Status: COMPLETED | OUTPATIENT
Start: 2019-01-01 | End: 2019-01-01

## 2019-01-01 RX ORDER — ENOXAPARIN SODIUM 100 MG/ML
1 INJECTION SUBCUTANEOUS EVERY 12 HOURS SCHEDULED
Qty: 40 ML | Refills: 3 | Status: SHIPPED | OUTPATIENT
Start: 2019-01-01 | End: 2019-01-01

## 2019-01-01 RX ORDER — MELATONIN
400 DAILY
Status: DISCONTINUED | OUTPATIENT
Start: 2019-01-01 | End: 2019-01-01

## 2019-01-01 RX ORDER — CEFDINIR 300 MG/1
300 CAPSULE ORAL 2 TIMES DAILY
Qty: 14 CAPSULE | Refills: 0 | Status: SHIPPED
Start: 2019-01-01 | End: 2019-01-01 | Stop reason: ALTCHOICE

## 2019-01-01 RX ORDER — ACETAMINOPHEN 650 MG/1
650 SUPPOSITORY RECTAL EVERY 4 HOURS PRN
Status: DISCONTINUED | OUTPATIENT
Start: 2019-01-01 | End: 2019-01-01

## 2019-01-01 RX ORDER — PANTOPRAZOLE SODIUM 40 MG/1
40 TABLET, DELAYED RELEASE ORAL
Status: DISCONTINUED | OUTPATIENT
Start: 2019-01-01 | End: 2019-01-01

## 2019-01-01 RX ORDER — ONDANSETRON 4 MG/1
4 TABLET, ORALLY DISINTEGRATING ORAL EVERY 8 HOURS PRN
COMMUNITY
End: 2019-01-01

## 2019-01-01 RX ORDER — IBUPROFEN 600 MG/1
600 TABLET ORAL ONCE
Status: COMPLETED | OUTPATIENT
Start: 2019-01-01 | End: 2019-01-01

## 2019-01-01 RX ORDER — METOCLOPRAMIDE HYDROCHLORIDE 5 MG/ML
10 INJECTION INTRAMUSCULAR; INTRAVENOUS EVERY 8 HOURS PRN
Status: DISCONTINUED | OUTPATIENT
Start: 2019-01-01 | End: 2019-01-01

## 2019-01-01 RX ORDER — HEPARIN SODIUM AND DEXTROSE 10000; 5 [USP'U]/100ML; G/100ML
INJECTION INTRAVENOUS CONTINUOUS
Status: DISCONTINUED | OUTPATIENT
Start: 2019-01-01 | End: 2019-01-01

## 2019-01-01 RX ORDER — ENOXAPARIN SODIUM 100 MG/ML
0.7 INJECTION SUBCUTANEOUS EVERY 12 HOURS
COMMUNITY

## 2019-01-01 RX ORDER — BISACODYL 10 MG
10 SUPPOSITORY, RECTAL RECTAL
Status: DISCONTINUED | OUTPATIENT
Start: 2019-01-01 | End: 2019-01-01

## 2019-01-01 RX ORDER — SULFAMETHOXAZOLE AND TRIMETHOPRIM 800; 160 MG/1; MG/1
1 TABLET ORAL
COMMUNITY

## 2019-01-01 RX ORDER — ZOLPIDEM TARTRATE 10 MG/1
10 TABLET ORAL NIGHTLY PRN
Status: DISCONTINUED | OUTPATIENT
Start: 2019-01-01 | End: 2019-01-01

## 2019-01-01 RX ORDER — POLYETHYLENE GLYCOL 3350 17 G/17G
17 POWDER, FOR SOLUTION ORAL DAILY PRN
Status: DISCONTINUED | OUTPATIENT
Start: 2019-01-01 | End: 2019-01-01

## 2019-01-01 RX ORDER — DEXTROSE MONOHYDRATE 25 G/50ML
50 INJECTION, SOLUTION INTRAVENOUS
Status: DISCONTINUED | OUTPATIENT
Start: 2019-01-01 | End: 2019-01-01

## 2019-01-01 RX ORDER — DEXAMETHASONE 4 MG/1
4 TABLET ORAL 2 TIMES DAILY WITH MEALS
Status: DISCONTINUED | OUTPATIENT
Start: 2019-01-01 | End: 2019-01-01

## 2019-01-01 RX ORDER — DEXAMETHASONE 4 MG/1
4 TABLET ORAL 2 TIMES DAILY WITH MEALS
COMMUNITY

## 2019-01-01 RX ORDER — HEPARIN SODIUM 5000 [USP'U]/ML
4000 INJECTION INTRAVENOUS; SUBCUTANEOUS ONCE
Status: COMPLETED | OUTPATIENT
Start: 2019-01-01 | End: 2019-01-01

## 2019-01-01 RX ORDER — DOCUSATE SODIUM 100 MG/1
100 CAPSULE, LIQUID FILLED ORAL 2 TIMES DAILY
Status: DISCONTINUED | OUTPATIENT
Start: 2019-01-01 | End: 2019-01-01

## 2019-01-01 RX ORDER — FAMOTIDINE 10 MG/ML
20 INJECTION, SOLUTION INTRAVENOUS 2 TIMES DAILY
Status: DISCONTINUED | OUTPATIENT
Start: 2019-01-01 | End: 2019-01-01

## 2019-01-01 RX ORDER — FAMOTIDINE 10 MG/ML
20 INJECTION, SOLUTION INTRAVENOUS DAILY
Status: DISCONTINUED | OUTPATIENT
Start: 2019-01-01 | End: 2019-01-01

## 2019-01-01 RX ORDER — POTASSIUM CHLORIDE 20 MEQ/1
40 TABLET, EXTENDED RELEASE ORAL ONCE
Status: COMPLETED | OUTPATIENT
Start: 2019-01-01 | End: 2019-01-01

## 2019-01-01 RX ORDER — CEFUROXIME AXETIL 500 MG/1
500 TABLET ORAL EVERY 12 HOURS SCHEDULED
Qty: 12 TABLET | Refills: 0 | Status: SHIPPED | OUTPATIENT
Start: 2019-01-01 | End: 2019-01-01

## 2019-01-01 RX ORDER — POTASSIUM CHLORIDE 1.5 G/1.77G
40 POWDER, FOR SOLUTION ORAL EVERY 4 HOURS
Status: DISCONTINUED | OUTPATIENT
Start: 2019-01-01 | End: 2019-01-01

## 2019-01-01 RX ORDER — DEXAMETHASONE 4 MG/1
4 TABLET ORAL DAILY
Status: DISCONTINUED | OUTPATIENT
Start: 2019-01-01 | End: 2019-01-01

## 2019-01-01 RX ORDER — ENOXAPARIN SODIUM 100 MG/ML
40 INJECTION SUBCUTANEOUS NIGHTLY
Status: DISCONTINUED | OUTPATIENT
Start: 2019-01-01 | End: 2019-01-01

## 2019-01-01 RX ORDER — DEXAMETHASONE SODIUM PHOSPHATE 4 MG/ML
4 VIAL (ML) INJECTION EVERY 6 HOURS
Status: DISCONTINUED | OUTPATIENT
Start: 2019-01-01 | End: 2019-01-01

## 2019-01-01 RX ORDER — ZOLPIDEM TARTRATE 10 MG/1
10 TABLET ORAL NIGHTLY
Status: DISCONTINUED | OUTPATIENT
Start: 2019-01-01 | End: 2019-01-01

## 2019-01-01 RX ORDER — ONDANSETRON 4 MG/1
4 TABLET, ORALLY DISINTEGRATING ORAL EVERY 6 HOURS PRN
Status: DISCONTINUED | OUTPATIENT
Start: 2019-01-01 | End: 2019-01-01

## 2019-01-01 RX ORDER — METOCLOPRAMIDE HYDROCHLORIDE 5 MG/ML
5 INJECTION INTRAMUSCULAR; INTRAVENOUS EVERY 8 HOURS PRN
Status: DISCONTINUED | OUTPATIENT
Start: 2019-01-01 | End: 2019-01-01

## 2019-04-30 PROBLEM — J18.9 COMMUNITY ACQUIRED PNEUMONIA OF RIGHT LUNG, UNSPECIFIED PART OF LUNG: Status: ACTIVE | Noted: 2019-01-01

## 2019-04-30 PROBLEM — R41.82 ALTERED MENTAL STATUS, UNSPECIFIED ALTERED MENTAL STATUS TYPE: Status: ACTIVE | Noted: 2019-01-01

## 2019-04-30 PROBLEM — J18.9 COMMUNITY ACQUIRED PNEUMONIA OF RIGHT LUNG: Status: ACTIVE | Noted: 2019-01-01

## 2019-04-30 PROBLEM — R09.02 HYPOXIA: Status: ACTIVE | Noted: 2019-01-01

## 2019-04-30 NOTE — PROGRESS NOTES
[facility] Pharmacy Note: Antimicrobial Dose Adjustment for: vancomycin (VANCOCIN)      Bk Tinoco was prescribed vancomycin 1000mg x1 dose in the ED. The dose was adjusted to vancomycin 1500mg (~25mg/kg) x1 dose per protocol.     Thank you,      Oneal Johns

## 2019-04-30 NOTE — ED INITIAL ASSESSMENT (HPI)
Arrives via [facility] from oncology office, where was found to be tachycardic in the 140s, hypoxic at 88% on RA, and low grade fever of 99.4.

## 2019-04-30 NOTE — CONSULTS
ADD: tobramycin d/c by Dr Eduardo Sidhu for Aminoglycoside Dosing      Maryann Rodriguez is a 70year old female who is being treated for pneumonia.       Pharmacy has been asked to dose tobramyci

## 2019-04-30 NOTE — ED PROVIDER NOTES
Patient Seen in: [facility] Emergency Department    History   Patient presents with:  Dyspnea DEMI SOB (respiratory)  Arrythmia/Palpitations (cardiovascular)  Fever (infectious)    Stated Complaint: hypoxia, tachycardia, fever    HPI    This is a 68- Eli Desai MD at [facility] ENDOSCOPY   • LEFT LASER-ASSISTED CATARACT SURGERY WITH ARCUATE INCISIONS PHACOEMULSIFICATION WITH POSTERIOR CHAMBER LENS IMPLANTATION Left 11/9/2016    Performed by Francia Ellison MD at [place] [address]. Regular rate and rhythm. S1 and S2. No murmurs, no rubs or gallops. ABDOMEN: Soft, nontender and nondistended. Normoactive bowel sounds. No rebound. No guarding. EXTREMITIES: Lower extremity edema. No calf pain or tenderness.   Warm with brisk capillar CO2, CL, NA, ALB, TP, TBIL, ALT, AST, ALP, CA, CREA, BUN, GLUC,    BASIC METABOLIC PANEL (8)   CBC, PLATELET; NO DIFFERENTIAL   RAINBOW DRAW BLUE   RAINBOW DRAW GOLD   RAINBOW DRAW LAVENDER   RAINBOW DRAW LIGHT GREEN   BLOOD CULTURE   BLOOD CULTURE   SPUTU clearing. Dictated by: Beni Mathew MD on 4/30/2019 at 17:18     Approved by: Beni Mathew MD              [facility]     Accu-Chek upon arrival 80    Sepsis protocol initiated. 2 peripheral IV lines established.   Patient given fluid bolus per sepsis protoc Disposition and Plan     Clinical Impression:  Community acquired pneumonia of right lung, unspecified part of lung  (primary encounter diagnosis)  Hypoxia  Malignant neoplasm of upper lobe of right lung ([address]  Brain metastasis (HCC)  Altered men

## 2019-05-01 NOTE — PROGRESS NOTES
05/01/19 0909   Vitals   Pulse 92   Resp 18   BP (!) 89/58  (returned to bed symptoms improving)   MAP (mmHg) 65   Patient Position Lying   Oxygen Therapy   SpO2 95 %   O2 Device Nasal cannula   O2 Flow Rate (L/min) 3 L/min     Dr Hannah Gimenez updated order

## 2019-05-01 NOTE — PLAN OF CARE
Pt has maintained off vasopressors, received 1 bolus of 500 ml lactated ringers in am, b/p has continued to improve.  Straight cath x 1 for urinary retention, bladder scan protocal.tolerated ambulating in elam on oxygen required 6 liters to maintain sats >9

## 2019-05-01 NOTE — CONSULTS
Critical Care H&P/Consult       NAME: Kaleigh Piedra - ROOM: [address] [place] [address] - MRN: [id number] - Age: 70year old - :  1950    Date of Admission: 2019  4:54 PM  Admission Diagnosis: Hypoxia [R09.02]  Brain metastasis (HCC) [C79.31]  Malignant neop • LEFT LASER-ASSISTED CATARACT SURGERY WITH ARCUATE INCISIONS PHACOEMULSIFICATION WITH POSTERIOR CHAMBER LENS IMPLANTATION Left 11/9/2016    Performed by Richard Mendez MD at [address] [place] [address]   • OTHER SURGICAL HISTORY  11/2017    parathyroidect Needs      Financial resource strain: Not on file      Food insecurity:        Worry: Not on file        Inability: Not on file      Transportation needs:        Medical: Not on file        Non-medical: Not on file    Tobacco Use      Smoking status: Forme D) 2000 UNITS Oral Cap Take by mouth. Disp:  Rfl:    RESTASIS 0.05 % Ophthalmic Emulsion Place 1 drop into both eyes every 12 (twelve) hours.  As directed  Disp:  Rfl:    Omega-3 Fatty Acids (FISH OIL) 1000 MG Oral Capsule Delayed Release Take 2 capsules by Neck:   Supple, symmetrical, trachea midline, no adenopathy;        thyroid:  No enlargement/tenderness/nodules; no carotid    bruit or JVD   Back:     Symmetric, no curvature, ROM normal, no CVA tenderness   Lungs:     Coarse BS on right, respirations u 11:00 AM 3.9 3.6 - 4.8 g/dL Final       Imaging: CT chest reviewed as noted above    ASSESSMENT/PLAN:  1. Septic Shock  -due to PNA  -wean Levo as tolerated  -IVF  2.  PNA  -abx per ID  -monitor cultures and adjust as indicated  -will need repeat CT as opt

## 2019-05-01 NOTE — ED NOTES
Assumed care of patient with c/o slight headache. Patient and family aware of plan to admit.tolerating infusions well.  states patient is fatigued but just returned from [place] last night.  Patient was at outpatient facility earlier today discovered t

## 2019-05-01 NOTE — PLAN OF CARE
Received pt on NC, weaning as tolerated. Up to bathroom with minimal assistance, voiding adequately. Urine sample sent. Hypotensive; levo gtt started. ID paged regarding clarification of abx orders - see MAR/orders. K replaced per protocol.  Home chemo med

## 2019-05-01 NOTE — CONSULTS
Pharmacy Note: Renal dose adjustment of Cefepime    Ortiz Leigh is a 70year old female who has been prescribed Cefepime. CrCl is 48.3 ml/min so the dose has been adjusted  to Cefepime 1g IVPB q12h per hospital renal dose adjustment protocol.   Pharm

## 2019-05-01 NOTE — PROGRESS NOTES
[address] [place] [address]    Initial Pharmacokinetic Consult for Vancomycin Dosing     Estela Akhtar is a 70year old female who is being treated for pneumonia.   Pharmacy has been asked to dose Vancomycin by Dr. Blaine Maxwell    She is allergic to codeine necessary.     Jaspal Rodriguez, PharmD  4/30/2019  11:33 PM  [address] [facility] Extension: [phone]

## 2019-05-01 NOTE — PROGRESS NOTES
Negar Gonzalez Patient Status:  Inpatient    1950 MRN [id number]   [facility] 4SW-A Attending Augusto Deshpande MD   Hosp Day # 1 PCP Garrett Bowser MD     Critical Care Progress Note      Assessment/Plan:  1.  Septic Shock - due BS.   Extremity: No clubbing or cyanosis normal edema. Skin: No rashes or lesions.     Recent Labs   Lab 04/30/19  1703 05/01/19  0431   RBC 4.39 3.88   HGB 13.7 12.0   HCT 41.5 37.0   MCV 94.5 95.4   MCH 31.2 30.9   MCHC 33.0 32.4   RDW 13.9 13.8   NEPRE

## 2019-05-01 NOTE — H&P
General Medicine H&P     Patient presents with:  Dyspnea DEMI SOB (respiratory)  Arrythmia/Palpitations (cardiovascular)  Fever (infectious)       PCP: Yassine Urbano MD    History of Present Illness: Patient is a 70year old female with PMH including but not metRONIDAZOLE 500 mg Q8H   cefepime 1 g Q12H   vancomycin 15 mg/kg Q12H       Social History    Tobacco Use      Smoking status: Former Smoker        Packs/day: 1.00        Years: 32.00        Pack years: 32      Smokeless tobacco: Never Used    Alcohol includes the Dose Index Registry. PATIENT STATED HISTORY: (As transcribed by Technologist)  Patient has history of lung cancer with brain mets. FINDINGS:  VENTRICLES/SULCI:  Ventricles and sulci are normal in size on the right.   There is mass effect up No aneurysm or visible dissection. LUNGS:  Within the right perihilar and upper lobe is increased coarse interstitial and airspace density with internal bronchiectasis may be chronic and due to treatment.   Adjacent nonspecific ground-glass density throug FINDINGS:  Normal heart size and pulmonary vascularity. Interstitial lung confluent alveolar opacities in right perihilar region and upper lung. Right paratracheal soft tissue widening and right hilar fullness. Volume loss in right lung.   Clear left sigifredo

## 2019-05-01 NOTE — CONSULTS
INFECTIOUS DISEASE CONSULT NOTE    Adina Polo Patient Status:  Inpatient    1950 MRN [id number]   [facility] 4SW-A Attending Elizabeth Hale MD   Hosp Day # 1 PCP Viktoria Mary ENDOSCOPY   • LEFT LASER-ASSISTED CATARACT SURGERY WITH ARCUATE INCISIONS PHACOEMULSIFICATION WITH POSTERIOR CHAMBER LENS IMPLANTATION Left 11/9/2016    Performed by Destin Stoll MD at [address] [place] [address]   • OTHER SURGICAL HISTORY  11/2017    para packet 17 g, 17 g, Oral, Daily PRN  •  magnesium hydroxide (MILK OF MAGNESIA) 400 MG/5ML suspension 30 mL, 30 mL, Oral, Daily PRN  •  bisacodyl (DULCOLAX) rectal suppository 10 mg, 10 mg, Rectal, Daily PRN  •  FLEET ENEMA (FLEET) 7-19 GM/118ML enema 133 mL 169*   BUN 17.0 20* 14   CREATSERUM 1.08* 0.91 0.59   GFRAA  --  74 108   GFRNAA  --  65 94   CA  --  8.5 7.9*   ALB 3.8 2.9*  --     135* 139   K 3.87 3.5 4.1   CL 98 103 112   CO2 23.8 21.0 23.0   ALKPHO 66 63  --    AST 21 19  --    ALT 16 22  -- performed for further evaluation as clinically indicated.    Dictated by: Velia Odom MD on 4/30/2019 at 18:17     Approved by: Velia Odom MD            Ct Angiography, Chest (cpt=71275)    Result Date: 4/30/2019  PROCEDURE:  CT ANGIOGRAPHY, CHEST (CPT=71275 BONES:  No bony lesion or fracture. CONCLUSION:  1. No CT evidence for pulmonary embolism. 2. Findings involving the right perihilar upper lobe may be chronic and due to treatment.  3. Nonspecific ground-glass density throughout the right lung parench more towards infection. Seems much better today already  - await sputum cx and MRSA nasal swab  - strep pneumo Uag  - narrow abx based on micro data    3.  Metastatic lung ca  - with known brain mets on decadron- immunosuppressed, but presentation not typic

## 2019-05-01 NOTE — PROGRESS NOTES
[facility]      Sepsis Reassessment Note    BP 94/51   Pulse 107   Temp 98.1 °F (36.7 °C)   Resp 18   Ht 161.3 cm (5' 3.5\")   Wt 141 lb 1.5 oz (64 kg)   SpO2 96%   BMI 24.60 kg/m²      10:40 PM    Cardiac:  Regularity: Regular  Rate: Tachycardic  H

## 2019-05-02 NOTE — CM/SW NOTE
05/02/19 1500   CM/SW Screening   Referral Source Physician   Information Source Chart review;Nursing rounds   Patient's Mental Status Alert;Oriented   Patient's [address]   Patient lives with Spouse; Children   Patient Status Prior to Admis

## 2019-05-02 NOTE — PROGRESS NOTES
INFECTIOUS DISEASE PROGRESS NOTE    Angie Wen Patient Status:  Inpatient    1950 MRN [id number]   [facility] 4SW-A Attending Rosa Coello MD   Hosp Day # 2 PCP Edmundo Shipley acute distress. Alert and oriented x 3. Vital signs: Blood pressure 111/74, pulse 96, temperature 97.5 °F (36.4 °C), temperature source Temporal, resp. rate 24, height 5' 3\" (1.6 m), weight 150 lb 5.7 oz (68.2 kg), SpO2 93 %, not currently breastfeeding. PLAN:    1. Septic shock- assume due to pna based on imaging. No other obvious source. No other localizing symptoms  - cont empiric abx  - follow Bcx and sputum cx  - now off pressors, shock resolved    2. Pneumonia- in the setting of lung ca.  Possibility

## 2019-05-02 NOTE — PROGRESS NOTES
[address] Patient Status:  Inpatient    1950 MRN [id number]   [facility] 4SW-A Attending Amanda Grace MD   Hosp Day # 2 PCP Ayush Flores MD     Pulm / Critical Care Progress Note     S: pt states she Recent Labs   Lab 04/30/19  1703 05/01/19  0431 05/02/19  0434   WBC 10.1 7.6 13.8*   HGB 13.7 12.0 11.3*   HCT 41.5 37.0 35.7   .0 201.0 202.0     Recent Labs   Lab 04/30/19  1710   INR 1.02         Recent Labs   Lab 04/30/19  1703 05/01/19  0431

## 2019-05-02 NOTE — PROGRESS NOTES
05/02/19 1116   Clinical Encounter Type   Visited With Patient   Sacramental Encounters   Sacrament of Sick-Anointing Anointed   The patient was seen by Curly Betancur. Received prayer, Scripture, support and Sacrament of the Sick.

## 2019-05-02 NOTE — PLAN OF CARE
1230 Pt. Received from ICU. Pt. Denies Pain, Denies SOB. VSS. Pt's nephew in to see pt to visit. Pt. Reports feeling \"tied down\" to all the equipment. Dr. Sonya Castellon called and order received to DC pt's Telemetry.  To do spot checks on pt's oxygen saturati Infectious Disease Progress Note    Author: Maria M Delgado M.D. Date & Time of service: 2017  1:00 PM    Chief Complaint:  Diabetic foot infection    Interval History:  -MAXIMUM TEMPERATURE 98.4 WBC 11.7 creatinine 0.85. No new issues overnight  17-MAXIMUM TEMPERATURE 98.4. No new issues overnight  Labs Reviewed, Medications Reviewed, Radiology Reviewed and Wound Reviewed.    Review of Systems:  Review of Systems   Constitutional: Negative for fever.   HENT: Negative for hearing loss.    Respiratory: Negative for cough and shortness of breath.    Cardiovascular: Negative for chest pain.   Gastrointestinal: Negative for abdominal pain, nausea and vomiting.   Genitourinary: Negative for dysuria.   Musculoskeletal: Positive for myalgias.   Neurological: Negative for focal weakness.       Hemodynamics:  Temp (24hrs), Av.7 °C (98.1 °F), Min:36.6 °C (97.8 °F), Max:36.9 °C (98.4 °F)  Temperature: 36.7 °C (98 °F)  Pulse  Av.8  Min: 53  Max: 73   Blood Pressure: 113/70       Physical Exam:  Physical Exam   Constitutional: He is oriented to person, place, and time. No distress.   HENT:   Head: Normocephalic.   Eyes: No scleral icterus.   Neck: Neck supple.   Cardiovascular: Regular rhythm.    No murmur heard.  Pulmonary/Chest: He has no wheezes. He has no rales.   Abdominal: Soft. There is no tenderness. There is no rebound.   Musculoskeletal:   Right foot bandaged and in a boot.  Refer to wound care notes   Neurological: He is alert and oriented to person, place, and time.   Vitals reviewed.      Meds:    Current Facility-Administered Medications:   •  vancomycin  •  MD ALERT... vancomycin  •  [COMPLETED] piperacillin-tazobactam **AND** piperacillin-tazobactam (ZOSYN) continuous infusion  •  naproxen  •  morphine injection  •  aspirin EC  •  atenolol  •  atorvastatin  •  clopidogrel  •  enoxaparin  •  furosemide  •  gabapentin  •  insulin glargine  •  isosorbide dinitrate  •  NIFEdipine SR  •   omeprazole  •  pentoxifylline CR  •  senna-docusate **AND** polyethylene glycol/lytes **AND** magnesium hydroxide **AND** bisacodyl  •  Respiratory Care per Protocol  •  acetaminophen  •  insulin regular **AND** Accu-Chek ACHS **AND** NOTIFY MD and PharmD **AND** glucose 4 g **AND** dextrose 50%  •  ondansetron  •  ondansetron  •  promethazine  •  promethazine  •  prochlorperazine    Labs:  Recent Labs      12/28/17   1354  12/29/17   0211  12/30/17 0214 12/31/17   0220   WBC  9.8  9.9  11.7*  11.2*   RBC  5.31  4.60*  4.37*  4.12*   HEMOGLOBIN  15.0  12.9*  12.4*  11.5*   HEMATOCRIT  45.0  39.1*  37.0*  35.1*   MCV  84.7  85.0  84.7  85.2   MCH  28.2  28.0  28.4  27.9   RDW  44.1  44.1  44.0  44.7   PLATELETCT  232  197  190  167   MPV  9.3  9.6  9.6  9.6   NEUTSPOLYS  60.40  64.10   --    --    LYMPHOCYTES  28.20  23.30   --    --    MONOCYTES  6.70  7.30   --    --    EOSINOPHILS  3.30  4.00   --    --    BASOPHILS  0.70  0.70   --    --      Recent Labs      12/29/17   0211  12/30/17 0214 12/31/17 0220   SODIUM  134*  136  132*   POTASSIUM  4.1  3.8  3.7   CHLORIDE  106  109  105   CO2  21  22  21   GLUCOSE  246*  178*  168*   BUN  10  10  11     Recent Labs      12/29/17   0211  12/30/17   0214  12/31/17   0220   CREATININE  0.81  0.85  0.73       Imaging:  Dx-foot-complete 3+ Right    Result Date: 12/28/2017 12/28/2017 1:11 PM HISTORY/REASON FOR EXAM:  Atraumatic Pain/Swelling/Deformity. TECHNIQUE/EXAM DESCRIPTION:  3 views RIGHT foot. COMPARISON:  None. FINDINGS:  There is soft tissue swelling and soft tissue gas adjacent to the distal right fourth metatarsal and right fourth toe. There is destruction of the distal right fourth metatarsal and base of the proximal phalanx. There may be erosive changes involving the margins of the fifth MTP joint. No other destructive lesions no acute fracture identified.     Distal lateral soft tissue swelling and gas consistent with infection. Destruction of the  distal fourth metatarsal base of the proximal phalanx consistent with osteomyelitis. Possible osteomyelitis involving the distal fifth metatarsal base of the fifth proximal phalanx.    Le Art Duplx/imag    Result Date: 2017  Lower Extremity  Arterial Duplex Report  Vascular Laboratory  CONCLUSIONS  BAILEY FARNSWORTH  Exam Date:     2017 10:50  Room #:     Inpatient  Priority:     Routine  Ht (in):             Wt (lb):  Ordering Physician:        CORBIN MENDENHALL  Referring Physician:       CORBIN MENDENHALL  Sonographer:               Steffi Boyle RVT  Study Type:                Complete Bilateral  Technical Quality:         Adequate  Age:    59    Gender:     M  MRN:    [id number]  :    1958      BSA:  Indications:     Claudication  CPT Codes:       59362  ICD Codes:       I70.213  History:         Unhealing wound on ball of right foot x4 months. History of                   diabetes.  Limitations:                RIGHT  Waveform        Peak Systolic Velocity (cm/s)                  Prox    Prox-Mid  Mid    Mid-Dist  Distal  Triphasic                         139                      CFA  Biphasic        121                                        PFA  Triphasic       164               116              85      SFA  Triphasic                         78                       POP  Bi, non-        155                                139     AT  reversed  Monophasic      19                                 17      PT  Absent          0                                  41      SHANNON                LEFT  Waveform        Peak Systolic Velocity (cm/s)                  Prox    Prox-Mid  Mid    Mid-Dist  Distal  Triphasic                         141                      CFA  Triphasic       120                                        PFA  Triphasic       129               98               70      SFA  Triphasic                         55                       POP  Bi, non-        59                                 78       AT  reversed  Bi, non-        65                                 23      PT  reversed  Absent          75                                 0       SHANNON  FINDINGS  Right.  Diffuse plaque is seen in the common femoral, profunda femoral, femoral,  and popliteal arteries without evidence of hemodynamically significant  stenosis.  No flow can be demonstrated in the proximal peroneal artery.  Waveforms at the posterior tibial are monophasic with severly reduced  amplitude.  Waveforms at the anterior tibial artery are biphasic.  Left.  Diffuse plaque is seen in the common femoral, profunda femoral, femoral,  and popliteal arteries without evidence of hemodynamically significant  stenosis.  No flow can be demonstrated in the distal peroneal artery.  Waveforms at the posterior tibial and anterior tibial biphasic.     Le Art/katja    Result Date: 2017   Vascular Laboratory  Conclusions  BAILEY FARNSWORTH  Age:    59    Gender:     M  MRN:    [id number]  :    1958      BSA:  Exam Date:     2017 12:43  Room #:     Inpatient  Priority:     Routine  Ht (in):             Wt (lb):  Ordering Physician:        CORBIN MENDENHALL  Referring Physician:       CORBIN MENDENHALL  Sonographer:               Steffi Boyle RVT  Study Type:                Limited Bilateral  Technical Quality:         Adequate  Indications:     Claudication  CPT Codes:       76701  ICD Codes:       I70.213  History:         Unhealing wound on ball of right foot. Diabetes mellitus.  Limitations:     IV in left arm.                 RIGHT  Waveform            Systolic BPs (mmHg)                             112           Brachial  Triphasic                                Common Femoral  Monophasic                 76            Posterior Tibial  Bi, non-                   118           Dorsalis Pedis  reversed                                           Peroneal                             1.05          KATJA                                           TBI                        LEFT  Waveform        Systolic BPs (mmHg)                                           Brachial  Triphasic                                Common Femoral  Bi, non-                   0             Posterior Tibial  reversed  Bi, non-                   100           Dorsalis Pedis  reversed                                           Peroneal                             0.89          TATY                                           TBI  Findings  Right.  Ankle brachial index is 1.05.  Doppler waveform of the common femoral and popliteal arteries are of high  amplitude and triphasic.  Doppler waveforms of the posterior tibial at the ankle are monophasic.  Doppler waveforms at the ankle are brisk and biphasic.  Left.  Ankle brachial index is 0.89 for the dorsalis pedis artery.  Ankle brachial index for the posterior tibial artery is non-compressible.  Doppler waveform of the common femoral artery is of high amplitude and  triphasic.  Doppler waveforms at the ankle are brisk and biphasic.  Arterial duplex scan was performed in accordance with lower extremity  arterial evaluation protocol - see separate report.       Micro:  Results     Procedure Component Value Units Date/Time    CULTURE TISSUE W/ GRM STAIN [[id number]]  (Abnormal) Collected:  12/29/17 1736    Order Status:  Completed Specimen:  Tissue Updated:  12/31/17 1032     Significant Indicator POS (POS)     Source TISS     Site 5th Metatarsal Head     Tissue Culture -- (A)     Growth noted after further incubation,see below for  organism identification.       Gram Stain Result --     Rare WBCs.  No organisms seen.       Tissue Culture -- (A)     Coagulase-negative Staphylococcus species  Rare growth       Tissue Culture -- (A)     Diphtheroids  Rare growth      ANAEROBIC CULTURE [[id number]] Collected:  12/29/17 1736    Order Status:  Completed Specimen:  Tissue Updated:  12/31/17 1032     Significant Indicator NEG     Source TISS     Site 5th Metatarsal Head     " Anaerobic Culture, Culture Res Culture in progress.    CULTURE TISSUE W/ GRM STAIN [[id number]]  (Abnormal) Collected:  12/29/17 1730    Order Status:  Completed Specimen:  Tissue Updated:  12/31/17 1025     Significant Indicator POS (POS)     Source TISS     Site 4th Metatarsal Head     Tissue Culture -- (A)     Growth noted after further incubation,see below for  organism identification.       Gram Stain Result --     Few WBCs.  No organisms seen.       Tissue Culture -- (A)     Diphtheroids  Light growth       Tissue Culture -- (A)     Coagulase-negative Staphylococcus species  Light growth      ANAEROBIC CULTURE [[id number]] Collected:  12/29/17 1730    Order Status:  Completed Specimen:  Tissue Updated:  12/31/17 1025     Significant Indicator NEG     Source TISS     Site 4th Metatarsal Head     Anaerobic Culture, Culture Res Culture in progress.    BLOOD CULTURE [[id number]] Collected:  12/29/17 1304    Order Status:  Completed Specimen:  Blood from Peripheral Updated:  12/30/17 0844     Significant Indicator NEG     Source BLD     Site PERIPHERAL     Blood Culture --     No Growth    Note: Blood cultures are incubated for 5 days and  are monitored continuously.Positive blood cultures  are called to the RN and reported as soon as  they are identified.      Narrative:       Per Hospital Policy: Only change Specimen Src: to \"Line\" if  specified by physician order.    BLOOD CULTURE [[id number]] Collected:  12/29/17 1305    Order Status:  Completed Specimen:  Blood from Peripheral Updated:  12/30/17 0844     Significant Indicator NEG     Source BLD     Site PERIPHERAL     Blood Culture --     No Growth    Note: Blood cultures are incubated for 5 days and  are monitored continuously.Positive blood cultures  are called to the RN and reported as soon as  they are identified.      Narrative:       Per Hospital Policy: Only change Specimen Src: to \"Line\" if  specified by physician order.    GRAM STAIN [[id number]] " Collected:  12/29/17 1736    Order Status:  Completed Specimen:  Tissue Updated:  12/30/17 0748     Significant Indicator .     Source TISS     Site 5th Metatarsal Head     Gram Stain Result --     Rare WBCs.  No organisms seen.      GRAM STAIN [[id number]] Collected:  12/29/17 1730    Order Status:  Completed Specimen:  Tissue Updated:  12/30/17 0748     Significant Indicator .     Source TISS     Site 4th Metatarsal Head     Gram Stain Result --     Few WBCs.  No organisms seen.      CULTURE WOUND W/ GRAM STAIN [[id number]]     Order Status:  No result Specimen:  Wound from Right Foot           Assessment:  Active Hospital Problems    Diagnosis   • *Osteomyelitis of right foot (CMS-HCC) [M86.9]   • Chronic anticoagulation [Z79.01]   • CHF (congestive heart failure) (CMS-HCC) [I50.9]   • COPD (chronic obstructive pulmonary disease) (CMS-HCC) [J44.9]   • Coronary artery disease [I25.10]   • DM (diabetes mellitus) (CMS-HCC) [E11.9]   • Essential hypertension [I10]   • PAOD (peripheral arterial occlusive disease) (CMS-HCC) [I77.9]       Plan:  Diabetic foot ulcer  Underlying osteomyelitis  Status post I&D and right fourth and fifth metatarsal head and proximal phalanx excision on 12/29/17  Cultures are Coagulase negative staph and diphtheroids  Continue to follow  Wound Care  Currently on vancomycin and Zosyn  We will check the wound with the wound care and then determine the course of antibiotics  May be able to De-escalate soon    Diabetes mellitus  Keep the blood sugars under control for wound healing    Discussed with internal medicine.

## 2019-05-02 NOTE — PLAN OF CARE
Received pt on NC, weaning O2 as tolerated. Up to bathroom with minimal assistance, voiding. Afebrile, NSR, VSS. IS and flutter use encouraged. Tolerating general diet with fair appetite. Home chemo med retrieved from pharmacy and sent home with .  A

## 2019-05-03 NOTE — PLAN OF CARE
Patient A+Ox4. C/o headache and PRN tylenol given x1. Also requested sleeping pill at bedtime. IV abt per MAR, afebrile this shift. On 2L-O2 via NC and sats in mid 90's. Up as tolerated.      Problem: CARDIOVASCULAR - ADULT  Goal: Maintains optimal cardiac

## 2019-05-03 NOTE — CM/SW NOTE
Per RT there are no E 02  tanks in the closet. CM spoke with Anh Hurt from [address] [place] [address] and he will bring tank to the room. Awaiting MD order to finalize delivery of home 02.     Mandy Everett MSN RN, [facility]/  P: [phone]    14:40  02 order sent to [facility]

## 2019-05-03 NOTE — PROGRESS NOTES
INFECTIOUS DISEASE PROGRESS NOTE    Rosalita Cord Patient Status:  Inpatient    1950 MRN [id number]   [facility] 4SW-A Attending Rosanne Wilson MD   Hosp Day # 3 PCP Pola Veliz not currently breastfeeding. Temp (24hrs), Av.4 °F (36.9 °C), Min:97.2 °F (36.2 °C), Max:100.8 °F (38.2 °C)  HEENT: no scleral icterus or conjunctival injection. Moist mucous membranes. Neck: No lymphadenopathy. Supple.   Respiratory: Clear to auscu Microbiology    Reviewed in EMR,   Hospital Encounter on 04/30/19   1.  BLOOD CULTURE     Status: None (Preliminary result)    Collection Time: 04/30/19  5:10 PM   Result Value Ref Range    Blood Culture Result No Growth 2 Days N/A     Radiology: re

## 2019-05-03 NOTE — PROGRESS NOTES
[facility] Hospitalist Progress Note     PCP: Raymond Prabhakar MD    Chief Complaint: follow-up    Pt seen and examined 5/2/2019    Overnight/Interim Events:      SUBJECTIVE:  127/88, 122. Breathing ok, on RA. No f/c. Cough, no sputum.      OBJECTIVE:  Temp:  [97.5 hours.      Meds:     • dexamethasone Sodium Phosphate  4 mg Intravenous Q12H   • enoxaparin  40 mg Subcutaneous Nightly   • docusate sodium  100 mg Oral BID   • metRONIDAZOLE  500 mg Intravenous Q8H   • cefepime  1 g Intravenous Q12H       witch hazel-gly

## 2019-05-03 NOTE — PLAN OF CARE
Problem: CARDIOVASCULAR - ADULT  Goal: Maintains optimal cardiac output and hemodynamic stability  Description  INTERVENTIONS:  - Monitor vital signs, rhythm, and trends  - Monitor for bleeding, hypotension and signs of decreased cardiac output  - Evalua Administer electrolyte replacement as ordered  - Monitor response to electrolyte replacements, including rhythm and repeat lab results as appropriate  - Fluid restriction as ordered  - Instruct patient on fluid and nutrition restrictions as appropriate  Ou

## 2019-05-03 NOTE — PROGRESS NOTES
[address] Patient Status:  Inpatient    1950 MRN [id number]   [facility] 4NW-A Attending Margorie Essex, MD   Hosp Day # 3 PCP Luis F Guajardo MD     Pulm / Critical Care Progress Note     S: pt is feeling 05/03/19  0549    142 143   K 4.1 3.9 3.8    112 112   CO2 23.0 26.0 26.0   BUN 14 11 11     Creatinine, Serum (mg/dL)   Date Value   10/20/2014 0.80     CREATININE (mg/dL)   Date Value   11/22/2011 0.7     Creatinine (mg/dL)   Date Value   05/

## 2019-05-06 NOTE — DISCHARGE SUMMARY
General Medicine Discharge Summary     Patient ID:  Wendy Fabrizio  70year old  9/20/1950    Admit date: 4/30/2019    Discharge date and time: 5/3/2019  6:14 PM     Attending Physician: Joanna att. provide adenoca  -follows c Dr. Kristy Vega from [place]/Dr. Gabriel Galicia [facility]   -decadron for brain mets and cerebral edema --> resume home decadron on dc  -immunosuppressed       Consults: IP CONSULT TO INFECTIOUS DISEASE  IP CONSULT TO PULMONOLOGY  IP CONSULT TO PHARMACY  IP CO PORTABLE  (CPT=71045), 4/30/2019, 17:07. INDICATIONS:  hypoxia, tachycardia, fever  TECHNIQUE:  IV contrast-enhanced multislice CT angiography is performed through the pulmonary arterial anatomy. 3D volume renderings are generated.   Dose reduction techniq represent an inflammatory or infectious process, correlate clinically. If patient has prior  studies these would be helpful to assess stability of the above findings.    Dictated by: Ofelia Aponte MD on 4/30/2019 at 18:23     Approved by: Ofelia Aponte MD mg total) by mouth 2 (two) times daily with meals. , Normal, Disp-90 tablet, R-0    alendronate 70 MG Oral Tab  Take 1 tablet (70 mg total) by mouth once a week., Normal, Disp-12 tablet, R-3    Cholecalciferol (VITAMIN D) 2000 UNITS Oral Cap  Take by mouth.

## 2019-05-09 PROBLEM — C34.91 ADENOCARCINOMA OF RIGHT LUNG (HCC): Status: ACTIVE | Noted: 2018-10-10

## 2019-05-09 PROBLEM — M25.552 LEFT HIP PAIN: Status: RESOLVED | Noted: 2017-07-26 | Resolved: 2019-01-01

## 2019-05-09 PROBLEM — M25.562 CHRONIC PAIN OF BOTH KNEES: Status: RESOLVED | Noted: 2017-07-26 | Resolved: 2019-01-01

## 2019-05-09 PROBLEM — Z86.39 HISTORY OF HYPERPARATHYROIDISM: Status: RESOLVED | Noted: 2018-10-10 | Resolved: 2019-01-01

## 2019-05-09 PROBLEM — Z86.39 HISTORY OF HYPERPARATHYROIDISM: Status: ACTIVE | Noted: 2018-10-10

## 2019-05-09 PROBLEM — J18.9 COMMUNITY ACQUIRED PNEUMONIA OF RIGHT LUNG, UNSPECIFIED PART OF LUNG: Status: RESOLVED | Noted: 2019-01-01 | Resolved: 2019-01-01

## 2019-05-09 PROBLEM — C78.7 LIVER METASTASIS (HCC): Status: ACTIVE | Noted: 2019-01-01

## 2019-05-09 PROBLEM — M25.561 CHRONIC PAIN OF BOTH KNEES: Status: RESOLVED | Noted: 2017-07-26 | Resolved: 2019-01-01

## 2019-05-09 PROBLEM — G89.29 CHRONIC PAIN OF BOTH KNEES: Status: RESOLVED | Noted: 2017-07-26 | Resolved: 2019-01-01

## 2019-05-09 PROBLEM — C79.51 SECONDARY MALIGNANT NEOPLASM OF BONE (HCC): Status: ACTIVE | Noted: 2019-01-01

## 2019-05-09 PROBLEM — R41.82 ALTERED MENTAL STATUS, UNSPECIFIED ALTERED MENTAL STATUS TYPE: Status: RESOLVED | Noted: 2019-01-01 | Resolved: 2019-01-01

## 2019-05-10 NOTE — ED INITIAL ASSESSMENT (HPI)
Pt here for dehydration  Per pt, \"I'm fine. I just came in for some fluids. I had a high heart rate and low blood pressure at the doctor's office and Dr. Kota Bell wanted me to come in. I was admitted for 5 days last week for pneumonia.  I have cancer--I'm done

## 2019-05-11 NOTE — ED PROVIDER NOTES
Patient Seen in: [facility] Emergency Department    History   Patient presents with:  Dehydration (metabolic/constitutional)    Stated Complaint: dehydration, low bp, tachycardic.  referred pmd    HPI    Delightful 79-year-old woman with a history of ARCUATE INCISIONS PHACOEMULSIFICATION WITH POSTERIOR CHAMBER LENS IMPLANTATION Left 11/9/2016    Performed by Freddy Benitez MD at [facility] [place]   • OTHER SURGICAL HISTORY  11/2017    parathyroidectomy   • OTHER SURGICAL HISTORY Right 09/26/201 COMP METABOLIC PANEL (14) - Abnormal; Notable for the following components:       Result Value    Glucose 133 (*)     Sodium 135 (*)     Total Protein 8.5 (*)     Albumin 3.1 (*)     Globulin  5.4 (*)     A/G Ratio 0.6 (*)     All other components within

## 2019-05-18 PROBLEM — R57.9 SHOCK (HCC): Status: ACTIVE | Noted: 2019-01-01

## 2019-05-18 PROBLEM — J18.9 PNEUMONIA DUE TO INFECTIOUS ORGANISM, UNSPECIFIED LATERALITY, UNSPECIFIED PART OF LUNG: Status: ACTIVE | Noted: 2019-01-01

## 2019-05-18 PROBLEM — I26.99 MULTIPLE PULMONARY EMBOLI (HCC): Status: ACTIVE | Noted: 2019-01-01

## 2019-05-18 NOTE — PROGRESS NOTES
ICU  Critical Care APRN Progress Note    NAME: Destin Fonseca - ROOM: 0[facility] - MRN: [id number] - Age: 70year old - KNT:6/56/7125    History Of Present Illness:  Destin Fonseca is a 70year old female with PMHx significant for metastatic lung cance General Appearance: Alert, cooperative, no distress, appears stated age  Neck: No JVD, neck supple, no adenopathy, trachea midline, no carotid bruits  Lungs: Clear to auscultation bilaterally, respirations unlabored  Heart: Regular rate and rhythm, S1 an PET/CT. Result Date: 5/9/2019  IMPRESSION: 1. Postoperative changes in the right hemithorax. Associated presumed post RT changes.  2.  Subtle irregular opacity over the right midlung may represent postoperative or post RT changes but other etiologies ca (cpt=70553)    Result Date: 5/15/2019  IMPRESSION: 1. Interval stable necrotic rim-enhancing LEFT caudate head metastatic lesion, with mild/moderate decrease in surrounding perilesional edema.  Interval stable LEFT frontal horn ependymal enhancement and fro pulmonary emboli. The overall degree of clot burden appears small. 2. Persisting extensive consolidation/pneumonia involving the medial aspects of the right upper lobe and right lower lobe without significant interval change.  3. Minimal right basilar pleu

## 2019-05-18 NOTE — ED NOTES
Pt back to er stating that she feels much better, appears less fatigued, more talkative stating that she knew she was sick this am, pink in color, hr down to 118,

## 2019-05-18 NOTE — ED NOTES
faily stating that pt had fallen out of bed this am, was lightheaded and weak, md aware, ct head to be ordered, a/o x3

## 2019-05-18 NOTE — PLAN OF CARE
Patient received from ED, alert and oriented, on 2L/NC. Patient ambulating to bathroom with SBA. Walked from stretcher to bed. , 2 daughters and sister present at bedside with the patient. No c/o pain or discomfort. SBP continues to be in the 80's.

## 2019-05-18 NOTE — ED INITIAL ASSESSMENT (HPI)
Pt presents via ems from home with fever, pt has had pneumonia for past month with increased weakness, denies cp, jamar, pt has stage 4 lung ca

## 2019-05-18 NOTE — ED PROVIDER NOTES
Patient Seen in: [facility] Emergency Department    History   Patient presents with:  Fever (infectious)    Stated Complaint:     HPI    Patient is a 21-year-old woman with metastatic non-small cell carcinoma with mets to her spine brain and liver w 32      Smokeless tobacco: Never Used    Alcohol use: Yes      Comment: social    Drug use: No      Review of Systems    Positive for stated complaint:   Other systems are as noted in HPI. Constitutional and vital signs reviewed.       All other systems re Manual 0.11 (*)     Myelocyte Absolute Manual 0.16 (*)     All other components within normal limits   CBC W/ DIFFERENTIAL - Abnormal; Notable for the following components:    RDW-SD 46.6 (*)     All other components within normal limits   LACTIC ACID, LILIANA lower lobe branches and lingular branches to     the left upper lobe. No large central pulmonary emboli. THORACIC AORTA:  No aneurysm or visible dissection.       LUNGS:  There is again evidence of extensive consolidative disease of the     right lung e the brain. Dose     reduction techniques were used. Dose information is transmitted to the [facility]     [facility] [employer] [facility]) NRDR ([address])     which includes the Dose Index     Registry.          PATIENT STATED HISTORY: (As t and right perihilar region with volume loss. No new pulmonary opacity.         =====    CONCLUSION:  No significant change. Persistence of right upper lobe and     perihilar opacities suggest atypical pneumonia or tumor.                    Dictated by extensive discussions with the patient, family, and clinical staff.               Disposition and Plan     Clinical Impression:  Multiple pulmonary emboli ([address]  (primary encounter diagnosis)  Pneumonia due to infectious organism, unspecified laterality, un

## 2019-05-19 NOTE — PROGRESS NOTES
[facility] Hospitalist note    PCP: Jeannine Mendez MD    Chief Complaint:  F/u PE, fever    SUBJECTIVE:  Pt still has some R calf discomfort- ultrasound found DVT there  Sitting up in chair, states she is feeling better otherwise    OBJECTIVE:  Temp:  [98 °F (36 Once per day on Mon Wed Fri   • Zolpidem Tartrate  5 mg Oral Nightly   • piperacillin-tazobactam  3.375 g Intravenous Q8H     • Continuous dose Heparin infusion 900 Units/hr (05/18/19 5304)   • sodium chloride 100 mL/hr at 05/19/19 1454   • norepinephrine

## 2019-05-19 NOTE — PLAN OF CARE
Assumed care at [facility]. Pt AOX4. Received on 2 LNC. Reports improving SOB. No more than baseline. SBP mid 80s. Asymptomatic. Critical care APN aware. 1 L bolus ordered. Remained hypotensive. Levo started to maintain SBP> 90. titrated off approx 0500.   Heparin i Assess the need for suctioning and perform as needed  - Assess and instruct to report SOB or any respiratory difficulty  - Respiratory Therapy support as indicated  - Manage/alleviate anxiety  - Monitor for signs/symptoms of CO2 retention  Outcome: Alannah

## 2019-05-19 NOTE — PLAN OF CARE
Problem: CARDIOVASCULAR - ADULT  Goal: Maintains optimal cardiac output and hemodynamic stability  Description  INTERVENTIONS:  - Monitor vital signs, rhythm, and trends  - Monitor for bleeding, hypotension and signs of decreased cardiac output  - Evalua assess patient for signs and symptoms of electrolyte imbalances  - Administer electrolyte replacement as ordered  - Monitor response to electrolyte replacements, including rhythm and repeat lab results as appropriate  - Fluid restriction as ordered  - Inst

## 2019-05-19 NOTE — CONSULTS
Pulmonary Consult       NAME: Wendy Dinh - ROOM: 883/484-S - MRN: [id number] - Age: 70year old - :  1950    Date of Admission: 2019 11:35 AM  Admission Diagnosis: Shock ([address] [R57.9]  Multiple pulmonary emboli (HCC) [I26.99]  Pneumonia mouth nightly. Disp:  Rfl:  5/17/2019 at 2100   Osimertinib Mesylate 80 MG Oral Tab Take 80 mg by mouth daily. Disp:  Rfl:  5/17/2019 at 1900   dexamethasone 2 MG Oral Tab Take 1 tablet (2 mg total) by mouth 2 (two) times daily with meals.  Disp: 90 table organization: Not on file        Attends meetings of clubs or organizations: Not on file        Relationship status: Not on file      Intimate partner violence:        Fear of current or ex partner: Not on file        Emotionally abused: Not on file at 5/19/2019 0906  Last data filed at 5/19/2019 0705  Gross per 24 hour   Intake 3456 ml   Output 1950 ml   Net 1506 ml       BP 99/58   Pulse 107   Temp 98 °F (36.7 °C)   Resp 24   Ht 5' 4\" (1.626 m)   Wt 147 lb 0.8 oz (66.7 kg)   SpO2 (!) 89%   BMI 25. 2 CO2 24.0 21.0 - 32.0 mmol/L    Anion Gap 9 0 - 18 mmol/L    BUN 19 (H) 7 - 18 mg/dL    Creatinine 1.01 0.55 - 1.02 mg/dL    BUN/CREA Ratio 18.8 10.0 - 20.0    Calcium, Total 8.6 8.5 - 10.1 mg/dL    Calculated Osmolality 284 275 - 295 mOsm/kg    GFR, Non Basophil % Manual 0 %    Metamyelocyte % 2 %    Myelocyte % 3 %    Total Cells Counted 100     RBC Morphology Normal Normal, Slide reviewed, see previous RBC morphology.     Platelet Morphology Normal Normal   LACTIC ACID, PLASMA    Collection Time: 05/18/1 0.55 - 1.02 mg/dL    BUN/CREA Ratio 14.9 10.0 - 20.0    Calcium, Total 7.4 (L) 8.5 - 10.1 mg/dL    Calculated Osmolality 292 275 - 295 mOsm/kg    GFR, Non- 90 >=60    GFR, -American 104 >=60   MAGNESIUM    Collection Time: 05/19/19

## 2019-05-20 NOTE — PROGRESS NOTES
Pulmonary Progress Note      NAME: Manuel Kay - ROOM: 7697/1145-O - MRN: [id number] - Age: 70year old - : 1950    Assessment/Plan:  1.  Pulmonary embolism - hemodynamically stable   -LE dopplers positive for DVT  -Echo results pending  -rock clear   Chest wall: No tenderness or deformity. Heart: Regular rate and rhythm, normal S1S2, no murmur. Abdomen: soft, non-tender, non-distended, positive BS.    Extremity: no edema   Skin: no new rash   Neuro: normal    Recent Labs   Lab 05/18/19  1146

## 2019-05-20 NOTE — PLAN OF CARE
A/OX4, RA, NSR/ST per Tele  Denies pain at this time  Heparin gtt infusing per order  Needs attended to, Will cont to monitor.       Problem: CARDIOVASCULAR - ADULT  Goal: Maintains optimal cardiac output and hemodynamic stability  Description  INTERVENTION

## 2019-05-20 NOTE — PLAN OF CARE
Pt is alert and oriented x4. NSR/ST, on room air. Echo normal. Ok for dc per pulm. Lovenox teaching complete.        Problem: CARDIOVASCULAR - ADULT  Goal: Maintains optimal cardiac output and hemodynamic stability  Description  INTERVENTIONS:  - Monitor vi respiratory difficulty  - Respiratory Therapy support as indicated  - Manage/alleviate anxiety  - Monitor for signs/symptoms of CO2 retention  5/20/2019 1616 by Raiza Patel RN  Outcome: Adequate for Discharge  5/20/2019 1615 by Raiza Patel RN  Outcom RN  Outcome: Progressing  Goal: Patient/Family Short Term Goal  Description  Patient's Short Term Goal: use bathroom today, not bedpan    Interventions:   Use call light,   - See additional Care Plan goals for specific interventions   5/20/2019 1616 by Her

## 2019-05-20 NOTE — PLAN OF CARE
NURSING DISCHARGE NOTE    Discharged Home via Wheelchair. Accompanied by Family member and Spouse  Belongings Taken by patient/family. IVs removed. F/u appts and dc instructions discussed. All questions answered.

## 2019-05-31 NOTE — DISCHARGE SUMMARY
General Medicine Discharge Summary     Patient ID:  Ml Reynolds  70year old  9/20/1950    Admit date: 5/18/2019    Discharge date and time: 5/20/2019  5:57 PM     Attending Physician: Krys Cooper MD    Primary Care Physician: Mike Michael MD Reports (last 14 days):  Ct Brain Or Head ([id number])    Result Date: 5/18/2019  CONCLUSION:  1. No acute process. 2. Ovoid low-density lesion within the left caudate head nucleus is again seen and is stable in size, currently measuring 4.5 cm.   This is most l Procedures: none     Code Status: FULL    Disposition: home    Patient Discharge Instructions:   Discharge Medication List as of 5/20/2019  4:35 PM    START taking these medications    Cefuroxime Axetil 500 MG Oral Tab  Take 1 tablet (500 mg total) by mout Time Coordinating Care: Greater than 30 minutes    Patient had opportunity to ask questions and state understand and agree with therapeutic plan as outlined above.      Thank Cathi Loza MD

## 2019-07-05 PROBLEM — N39.0 SEPSIS DUE TO URINARY TRACT INFECTION (HCC): Status: ACTIVE | Noted: 2019-01-01

## 2019-07-05 PROBLEM — A41.9 SEPSIS DUE TO URINARY TRACT INFECTION (HCC): Status: ACTIVE | Noted: 2019-01-01

## 2019-07-05 NOTE — ED PROVIDER NOTES
Patient Seen in: [facility] Emergency Department    History   Patient presents with:  Urinary Symptoms (urologic)    Stated Complaint: uti    HPI    Patient is a 74-year-old female, history of metastatic adenocarcinoma, history of PE on Lovenox, her years: 27      Smokeless tobacco: Never Used    Alcohol use: Yes      Comment: social    Drug use: No      Review of Systems    Positive for stated complaint: uti  Other systems are as noted in HPI. Constitutional and vital signs reviewed.       All other (*)     GFR, -American 50 (*)     Albumin 3.1 (*)     A/G Ratio 0.8 (*)     All other components within normal limits   URINE MICROSCOPIC W REFLEX CULTURE - Abnormal; Notable for the following components:    WBC Urine >50 (*)     RBC URINE >10 (*) 7/5/2019  1:14 PM                 Disposition and Plan     Clinical Impression:  Sepsis due to urinary tract infection ([facility]  (primary encounter diagnosis)    Disposition:  Admit  7/5/2019  1:14 pm    Follow-up:  No follow-up provider specified.       Medic

## 2019-07-05 NOTE — PLAN OF CARE
Received patient from ER approx 1630. A&Ox4. Ambulated from ER stretcher to ICU bed and placed on monitors. BP borderline with MAP >65, patient asymptomatic. States \"I feel great now\" and that bp on normal day when visiting MD is 90/70.  Levophed eventual

## 2019-07-05 NOTE — ED INITIAL ASSESSMENT (HPI)
Patient with c/o UTI symptoms for past two days. Patient has had dysuria, cloudiness, and fevers. Patient was prescribed Levaquin by her oncologist, currently being treated for metastatic lung cancer.

## 2019-07-05 NOTE — H&P
[facility] Hospitalist H&P       CC: Patient presents with:  Urinary Symptoms (urologic)       PCP: Blanca Trevino MD    History of Present Illness:  Ms. Bret Villa is a 71 yo female with PMH of stage IV lung cancer with mets to bone, brain, liver, adrenal gland; PE Gris Boyle MD at [facility] MAIN OR        ALL:    Codeine                 NAUSEA AND VOMITING     Home Medications:    Outpatient Medications Marked as Taking for the 7/5/19 encounter [facility] Encounter):  levofloxacin 250 MG Oral Tab Take 250 mg by mouth daily. Encounters:  07/05/19 : Chad Duverney 94/67  06/27/19 : 98/75  05/30/19 : 94/62    Wt Readings from Last 3 Encounters:  07/05/19 : 140 lb (63.5 kg)  06/27/19 : 143 lb (64.9 kg)  05/30/19 : 138 lb (62.6 kg)      Wt Readings from Last 6 Encounters:  07/05/19 : 140 lb 40 mm.  This is suspicious for unfavorable progression of metastatic disease. Further evaluation with cross-sectional imaging using CT or MRI with contrast is recommended. BILIARY:  No visible dilatation or calcification.   PANCREAS:  No lesion, fluid sergio obtained. COMPARISON:  [facility] , XR CHEST AP PORTABLE  (CPT=71045), 5/18/2019, 12:09. INDICATIONS:  sepsis, r/o pneumonia  PATIENT STATED HISTORY: (As transcribed by Technologist)  Patient presents with dysuria for the past two days.     FINDINGS: Cardiac

## 2019-07-05 NOTE — PROGRESS NOTES
Pharmacy Note:  Renal Dose Adjustment for Metoclopramide (REGLAN)         Javier Dhaliwal has been prescribed metoclopramide 10 mg q8hr prn.     Est CrCl: ~35 mL/min    Her calculated creatinine clearance is < 40 mL/min, therefore the dose of metoclopra

## 2019-07-06 NOTE — PROGRESS NOTES
[facility] Hospitalist Progress Note     [facility]      SUBJECTIVE:  No acute events overnight  Patient feeling well this morning  Small amount of diarrhea, negative for c diff  On low dose norepi overnight, off this AM    OBJECTIVE:  Temp:  [97.5 °F (36. [employer]) NRDR ([address]) which includes the Dose Index Registry.   PATIENT STATED HISTORY: (As transcribed by Technologist)     FINDINGS:  LIVER:  Enlarging hypoattenuating lesion within the dome of the liver measuring 4 lung malignancy in the right lower lobe.     Dictated by: Kristine Pina MD on 7/05/2019 at 15:49     Approved by: Kristine Pina MD            Xr Chest Ap Portable  (cpt=71045)    PROCEDURE:  XR CHEST AP PORTABLE  (CPT=71045)  TECHNIQUE:  AP chest radiograph (RESTASIS) 0.05 % ophthalmic emulsion 1 drop 1 drop Both Eyes Q12H PRN   Pantoprazole Sodium (PROTONIX) 40 mg in Sodium Chloride 0.9 % 10 mL IV push 40 mg Intravenous QAM AC   Or      Pantoprazole Sodium (PROTONIX) EC tab 40 mg 40 mg Oral QAM AC        Ass

## 2019-07-06 NOTE — PLAN OF CARE
Assumed care of patient at 0730. A&O x4; patient in good spirits, states she feels \"really good\", no c/o offered. 2L nc to room air; ST to monitor; BP maintained on Levo for MAP >60 SBP 90. Ambulate to bathroom or bedside commode without issue.  Large y

## 2019-07-06 NOTE — PLAN OF CARE
Spoke with Dr. Medina Velez with update on pt B/P while off pressors. Pt baseline b/p is on the low side  with sbp upper 80-low/mid 90's. She has been maintaining SBP within these limits as well as MAP>60.   Dr. Dunn Drain that if the patient is stable, and she

## 2019-07-06 NOTE — PLAN OF CARE
Patient received as transfer from ICU, alert and oriented x 4, lungs clear on room air, abdomen soft, bowel sounds present, passing flatus, denies n/v/d and constipation. States voids frequently due to IV fluids, denies any recent falls.  IVF infusing, cathy

## 2019-07-06 NOTE — PLAN OF CARE
Assumed care of patient for night shift. Patient alert and oriented x4. Frequently getting up and using commode for urination. Small amount of loose stools with urination at times. Sample sent, negative for c. Dif.  BP maintained on levo gtt, titrated per s

## 2019-07-07 NOTE — DISCHARGE SUMMARY
General Medicine Discharge Summary     Patient ID:  Dahlia Garcia  70year old  9/20/1950    Admit date: 7/5/2019    Discharge date and time: 7/7/19    Attending Physician: Tulio Urbina MD     Primary Care Physician: Blanca Trevino MD     Reason for home with her tomorrow and take her to her appointments.  She has f/u with oncologist already on Tuesday, July 9th     # Recent B/l PE and RLE DVT  - Continue lovenox BID     # MODESTO, Resolved  - Cr slightly up on admit  - Improved this AM with IVF hydration

## 2019-07-07 NOTE — PLAN OF CARE
Assumed care of the patient @ 07:00, resting in bed, asleep. Alert x's 4, c/o headache, tylenol given. IVs patent. Voiding freely, tolerating diet. Afebrile, vitals stable. Call light in reach, frequent rounds made, all questions encouraged and answered.  Trudy Phalen fever/infection during anticipated neutropenic period  Description  INTERVENTIONS  - Monitor WBC  - Administer growth factors as ordered  - Implement neutropenic guidelines  Outcome: Progressing

## 2019-10-23 PROBLEM — J18.9 COMMUNITY ACQUIRED PNEUMONIA OF RIGHT LUNG, UNSPECIFIED PART OF LUNG: Status: ACTIVE | Noted: 2019-01-01

## 2019-10-23 PROBLEM — E86.0 DEHYDRATION: Status: ACTIVE | Noted: 2019-01-01

## 2019-10-23 PROBLEM — R65.21 SEPTIC SHOCK (HCC): Status: ACTIVE | Noted: 2019-01-01

## 2019-10-23 PROBLEM — A41.9 SEPTIC SHOCK (HCC): Status: ACTIVE | Noted: 2019-01-01

## 2019-10-23 PROBLEM — C50.919 METASTATIC BREAST CANCER (HCC): Status: ACTIVE | Noted: 2019-01-01

## 2019-10-23 NOTE — ED PROVIDER NOTES
Patient Seen in: [facility] Emergency Department      History   Patient presents with:  Fatigue (constitutional, neurologic)  Fever (infectious)    Stated Complaint: fatigue, fever    HPI    18-year-old woman with breast cancer metastatic to brain a Former Smoker        Packs/day: 1.00        Years: 32.00        Pack years: 32      Smokeless tobacco: Never Used    Alcohol use: Yes      Comment: social    Drug use:  No             Review of Systems    Positive for stated complaint: fatigue, fever  Other American 57 (*)     AST 78 (*)     Alkaline Phosphatase 501 (*)     Albumin 2.0 (*)     A/G Ratio 0.5 (*)     All other components within normal limits   CBC W/ DIFFERENTIAL - Abnormal; Notable for the following components:    WBC 11.1 (*)     HGB 11.4 (*) cc/kg. Because her blood pressure was still systolic in the 05T though her heart rate came down to the 1 teens she was given another liter of saline. Case discussed with Dr. Delia Connolly from oncology.     Case discussed with the [facility] hospitalist    Case discus

## 2019-10-23 NOTE — H&P
[facility] Hospitalist History and Physical      CC: weakness, fever    PCP: Norvel Lefort, MD    History of Present Illness: Patient is a 78year old female with PMH sig for Stage IV NSCLC to bone, brain and liver and adrenal glands presenting to ER due to weaknes Enoxaparin Sodium (LOVENOX) 80 MG/0.8ML Subcutaneous Solution, Inject 0.7 mg into the skin every 12 (twelve) hours. , Disp: , Rfl:   folic acid 597 MCG Oral Tab, Take 400 mcg by mouth daily. , Disp: , Rfl:   Osimertinib Mesylate 80 MG Oral Tab, Take 80 m Tachy, regular   GI: Abdomen soft, nontender, nondistended, no organomegaly, bowel sounds present  Ext: No cyanosis, clubbing, or edema  Skin: Skin warm and dry. No rashes or lesions.   MSK:No digit cyanosis  Psych: AAO x 3, with appropriate affect        D consulted    # Stage IV NSCLC  - Heme/Onc on consult  - Follows with Onc/Rad Onc out of [facility]    # Proph: lovenox    Outpatient records or previous hospital records reviewed.    [facility] hospitalist to continue to follow patient while in house     [place]

## 2019-10-23 NOTE — PROGRESS NOTES
[facility]      Sepsis Reassessment Note    /69   Pulse (!) 140   Temp 98.8 °F (37.1 °C) (Temporal)   Resp 20   Ht 5' 4\" (1.626 m)   Wt 132 lb 4.4 oz (60 kg)   SpO2 95%   BMI 22.71 kg/m²      2:46 PM    Cardiac:  Regularity: Regular  Rate:  Ta

## 2019-10-23 NOTE — ED NOTES
Nurse to Nurse report called to [facility], [id number]. Pt started on Dopamine for hypotension. Pt okay to unit per Dr. Neli Blood. Pt to unit on monitor accompanied by RN.

## 2019-10-23 NOTE — CONSULTS
[address] Patient Status:  Inpatient    1950 MRN [id number]   [facility] 4SW-A Attending Nestor Davila # 0 PCP Michelle Galeana MD     Date of Admission: 10/23/2019  Admission Diagnosis: Kerri Halocharanjit PHACOEMULSIFICATION WITH POSTERIOR CHAMBER LENS IMPLANTATION WITH ORA Right 11/30/2016    Performed by Jamie Toro MD at [facility]   • THYROIDECTOMY N/A 11/14/2017    Performed by Tere Hewitt MD at [facility] MAIN OR           Codeine Continuous  •  vasopressin (PITRESSIN) 20 Units in sodium chloride 0.9% 50 mL infusion for shock, 0.04 Units/min, Intravenous, Continuous PRN  •  phenylephrine in NaCl (DANIEL-SYNEPHRINE) 50 mg/250 ml premix infusion SOLN, 100-200 mcg/min, Intravenous, Contin membranes                          Lungs: rhonchi over RUL                          Chest wall: No tenderness or deformity.                           ODMOV: [id number], regular rhythm                           Abdomen: soft, non-tender, non-distended, posi 5. FEN:  -swallow eval  6. Proph:  -lovenox, although plts are dropping, apprec heme recs  7.  Dispo:  -full code  -discussed witgh family at bedside   -will follow     Critical Care Time greater than: 35 minutes    Mary Ellen Rivera MD  10/23/2019  2:

## 2019-10-23 NOTE — CONSULTS
Pharmacy Medication Reconciliation:    OK to change Lovenox to 1 mg/kg sq every 12 hours using current patient weight of 60 kg per Dr Joselyn Stahl.     Kody Swann, PharmD  [phone]

## 2019-10-23 NOTE — PROGRESS NOTES
10/23/19 5652   Clinical Encounter Type   Visited With Health care provider  (Spoke with nurse. She said wife and  had an initial conversation about Advance Directives.  is POA.  Wife is decisional. Nurse said she would call  when h

## 2019-10-23 NOTE — PLAN OF CARE
Patient admitted from ER for sepsis, pneumonia, history of Lung cancer with mets to brain liver and adrenal glands,  did state there is a spot on lumbar vertebrae at last scan.  Per family patient had high intensity radiation 2 weeks ago at [facility]

## 2019-10-23 NOTE — PROGRESS NOTES
10/23/19 0091   Clinical Encounter Type   Visited With Patient and family together  (Nurse called to say  in room with patient. Daughter and sister present also. Introduced myself. Explained value of POLST. Gave POLST to patient and .  They

## 2019-10-24 NOTE — OCCUPATIONAL THERAPY NOTE
Order received for OT eval per [facility] protocol. Discussed in rounds that patient unable to tolerate OOB activity due to hypotension. Will continue to follow as patient appropriate and schedule permits.      Thank you for allowing me to care for this patient,

## 2019-10-24 NOTE — CONSULTS
[facility]  Report of Consultation    Manuel Kay Patient Status:  Inpatient    1950 MRN [id number]   [facility] 4SW-A Attending Gilford Guy,*   Hosp Day # 1 PCP Crista Wright MD     Reason for Consultation:  Jelena Herndon 02/2017    IOL   • COLONOSCOPY  1.13.15    Dr Kareem Yoder, sessile polyp, rpt 5 yrs   • COLONOSCOPY     • ENDOBRONCHIAL ULTRASOUND (EBUS) N/A 9/26/2018    Performed by Shellee Soulier, MD at [facility] ENDOSCOPY   • LEFT LASER-ASSISTED CATARACT SURGERY WITH ARCUATE INCI PRN  •  phenylephrine in NaCl (DANIEL-SYNEPHRINE) 50 mg/250 ml premix infusion SOLN, 100-200 mcg/min, Intravenous, Continuous  •  azithromycin (ZITHROMAX) 500 mg in sodium chloride 0.9% 250 mL IVPB, 500 mg, Intravenous, Q24H  •  Enoxaparin Sodium (LOVENOX) 80 23.0 10/24/2019     10/24/2019    CA 7.7 10/24/2019    ALB 2.0 10/23/2019    ALKPHO 501 10/23/2019    BILT 0.5 10/23/2019    TP 6.4 10/23/2019    AST 78 10/23/2019    ALT 48 10/23/2019    PTT 41.5 10/24/2019    INR 1.58 10/24/2019    MG 2.1 10/24/20 continue therapeutic Lovenox yesterday as platelet count was 52. Given platelet count at 38 today, will hold Lovenox for now. D/W Pulmonary team and RNs  3. CBC daily. Transfuse to keep hemoglobin > 7 gram, platelet count >92 in setting of fever/sepsis.

## 2019-10-24 NOTE — PLAN OF CARE
Received pt asleep, on neosynephrine iv drip-will keep sbp 90 and map 65, pt opens eyes to her name, pt knows she is in the hospital, follows all simple commands, denies pain, requested bedpan, did urinate small amount of urine,   at bedside, plan o wound bed, drain sites and surrounding tissue  - Implement wound care per orders  - Initiate isolation precautions as appropriate  - Initiate Pressure Ulcer prevention bundle as indicated  Outcome: Progressing

## 2019-10-24 NOTE — SLP NOTE
ADULT SWALLOWING EVALUATION    ASSESSMENT    ASSESSMENT/OVERALL IMPRESSION:  Pt seen this AM for bedside swallow evaluation. RN approved session. Pt admitted to hospital due to fever and fatigue. Pt diagnosed with septic shock secondary to pneumonia.  CXR n List  Principal Problem:    Septic shock [facility])  Active Problems:    Metastatic breast cancer ([facility] [address]    Community acquired pneumonia of right lung, unspecified part of lung    Dehydration      Past Medical History  Past Medical History:   Diagnosis Date   • Pharyngeal Phase of Swallow: Within Functional Limits     (Please note: Silent aspiration cannot be evaluated clinically.  Videofluoroscopic Swallow Study is required to rule-out silent aspiration.)    Esophageal Phase of Swallow: No complaints consistent

## 2019-10-24 NOTE — PLAN OF CARE
Pt assisted and reassessed with each position change from lying to sitting to up to [facility], initially on neosynephrine at 50 mcqs, HR noted to increase into the 100s with sbp decrease less than 90. Titrated neosynephrine up to 80 mcqs and assisted pt to [facility].

## 2019-10-24 NOTE — PHYSICAL THERAPY NOTE
Order received for PT eval per [facility] protocol. Discussed in rounds that patient unable to tolerate OOB activity due to hypotension. Will continue to follow as patient appropriate and schedule permits.

## 2019-10-24 NOTE — PLAN OF CARE
Report endorsed by Night RN informed of frequent voids small amounts 50 ml- 125, recent void 125 with PVR . 633ml, Dr Rodas Console APN informed await further orders as protocal not orders and informed to notify MD after bladder scan requested.

## 2019-10-24 NOTE — PROGRESS NOTES
Critical Care Progress Note     Assessment / Plan:  1. Septic shock - likely due to pneumonia  - stop IVFs  - vasopressors as needed to maintain SBP >90  - trend lactic acid  - abx as below  2.  PNA  - PCT is elevated  - respiratory panel negative  - zosyn

## 2019-10-24 NOTE — DIETARY NOTE
[address] [facility]     Admitting diagnosis:  Dehydration [E86.0]  Metastatic breast cancer ([facility] [address] [C50.919]  Se[facility] [A41.9, R65.21]  Community acquired pneumonia of right lung, unspecified part of lung [J18

## 2019-10-24 NOTE — PROGRESS NOTES
[facility] Hospitalist Progress Note                                                                   [address]  9/20/1950    SUBJECTIVE:  Pt seen and examined.   States she fees a little b sodium chloride 125 mL/hr at 10/24/19 0908     PRN: vasopressin (PITRESSIN) infusion for shock, morphINE sulfate **OR** morphINE sulfate, traMADol HCl    Assessment/Plan:  Principal Problem:    Septic shock (HCC)  Active Problems:    Metastatic breast canc

## 2019-10-25 NOTE — PROGRESS NOTES
[address] Patient Status:  Inpatient    1950 MRN [id number]   [facility] 4SW-A Attending Cm Artis,*   Hosp Day # 2 PCP Emma Erwin MD     Interval History--  No major clinical Metastatic adenocarcinoma ([facility] [address] 09/26/2018    Right lung, Liver, Brain, Adrenal gland, Lumbar Vert   • Muscle weakness     3 falls recently   • Personal history of antineoplastic chemotherapy     daily oral chemo   • Problems with swallowing     in last 10 (PEPCID) injection 20 mg, 20 mg, Intravenous, BID  •  ondansetron (ZOFRAN-ODT) disintegrating tab 4 mg, 4 mg, Oral, Q6H PRN **OR** ondansetron HCl (ZOFRAN) injection 4 mg, 4 mg, Intravenous, Q6H PRN  •  Metoclopramide HCl (REGLAN) tab 10 mg, 10 mg, Oral, Q PLT 29.0 10/25/2019    CREATSERUM 0.34 10/25/2019    BUN 10 10/25/2019     10/25/2019    K 3.8 10/25/2019     10/25/2019    CO2 25.0 10/25/2019     10/25/2019    CA 8.3 10/25/2019    MG 2.1 10/25/2019    PHOS 1.5 10/25/2019       Imag unspecified part of lung     Dehydration    Impression  1.  Stage IV NSCLC, currently on Tagris[place] per Dr. Malcom Infante (currently on hold during acute events)  Per notes at [facility], had recent progression and there has been consideration of changing therapy to chemo

## 2019-10-25 NOTE — PLAN OF CARE
Received pt at 0730. Pt quiet, turn q 2 hours. Fuller draining clear yellow urine. Family and friends at the bedside. Pt dangle at side of the bed, pt sleepy confused. Midline placed, no blood return noted when drawing blood.  Derian titrated to 40 no appetite

## 2019-10-25 NOTE — PLAN OF CARE
Readdressed pt ongoing urine retention in pt rounds after continued retention after void attempt on BSC. Order to place sinclair catheter, Inserted carefully via aseptic technique per protocal with return of urine tolerated well.

## 2019-10-25 NOTE — PHYSICAL THERAPY NOTE
PHYSICAL THERAPY EVALUATION - INPATIENT     Room Number: 471/471-A  Evaluation Date: 10/25/2019  Type of Evaluation: Initial  Physician Order: PT Eval and Treat    Presenting Problem: septic shock, pneumonia   Reason for Therapy: Mobility Dysfunction PHACOEMULSIFICATION WITH POSTERIOR CHAMBER LENS IMPLANTATION Left 11/9/2016    Performed by Li Matthew MD at [facility]   • NEEDLE BIOPSY RIGHT      right lung   • OTHER SURGICAL HISTORY  11/2017    parathyroidectomy   • OTHER SURGICAL H solutions    RANGE OF MOTION AND STRENGTH ASSESSMENT  Upper extremity ROM and strength- see OT eval    Lower extremity ROM is within functional limits     Lower extremity strength is within functional limits except for the following:    Right Knee extensio reach. RN aware. Exercise/Education Provided:  Bed mobility  Functional activity tolerated  Posture  Transfer training    Patient End of Session: In bed;Needs met;Call light within reach;RN aware of session/findings; All patient questions and concerns a able to tolerate sitting EOB for 5 min with supervision.       Goal #4    Goal #5    Goal #6    Goal Comments: Goals established on 10/25/2019

## 2019-10-25 NOTE — OCCUPATIONAL THERAPY NOTE
OCCUPATIONAL THERAPY EVALUATION - INPATIENT     Room Number: 471/471-A  Evaluation Date: 10/25/2019  Type of Evaluation: Initial  Presenting Problem: septic shock, dehydration, PNA     Physician Order: IP Consult to Occupational Therapy  Reason for Therapy CATARACT SURGERY WITH ARCUATE INCISIONS PHACOEMULSIFICATION WITH POSTERIOR CHAMBER LENS IMPLANTATION Left 11/9/2016    Performed by Maxwell Shields MD at [facility]   • NEEDLE BIOPSY RIGHT      right lung   • OTHER SURGICAL HISTORY  11/2017 2  Location: R lower abd   Management Techniques:  Activity promotion;Repositioning    COGNITION  Orientation Level:  oriented x4  Problem Solving:  assistance required to generate solutions and assistance required to implement solutions    VISION  Current supine with total A. Repositioned to [address] with total A. Pt was educated on safety precautions. Pt educated on the importance of frequent positional changes. Pt was left in her bed with questions answered, needs met and call light within reach.  Pt's RN/PCT w Recommendations: 24 hour care/supervision  OT Device Recommendations: TBD    PLAN  OT Treatment Plan: Balance activities; Energy conservation/work simplification techniques;ADL training;Functional transfer training; Endurance training;UE strengthening/ROM;Co

## 2019-10-25 NOTE — CM/SW NOTE
10/25/19 1600   CM/SW Referral Data   Referral Source    Reason for Referral Discharge planning   Informant Patient;Spouse  (sister and friend at bedside)   Patient Info   Patient's Mental Status Alert;Oriented   Patient lives with Spouse

## 2019-10-25 NOTE — PROGRESS NOTES
10/25/19 7560   Clinical Encounter Type   Visited With Patient   Sacramental Encounters   Sacrament of Sick-Anointing Anointed   The patient was seen by Catherine Alejandra. Received prayer, Scripture, support and Sacrament of the Sick.

## 2019-10-25 NOTE — PLAN OF CARE
Assumed care after RN report; pt resting in bed. A&Ox3-4; forgetful - reoriented as needed. Afebrile. SR on monitor. Denies pain. 2Lnc; tolerating. Derian gtt for SBP>90/MAP>65; titrated. Fuller with adequate output.  Nausea reported; CC Magdalena aware; zofran o

## 2019-10-25 NOTE — PROGRESS NOTES
[address] Patient Status:  Inpatient    1950 MRN [id number]   [facility] 4SW-A Attending Cm Artis,*   Hosp Day # 2 PCP Emma [place] MD     Pulm / Critical Care Progress Note     S: overall feel 10/24/19  0427 10/24/19  1157 10/25/19  0437   WBC 11.2* 12.0*  12.0* 11.6*   HGB 7.3* 7.7*  7.7* 8.1*   HCT 25.2* 26.5*  26.5* 27.0*   PLT 38.0* 44.0*  44.0* 29.0*     Recent Labs   Lab 10/24/19  0426   INR 1.58*         Recent Labs   Lab 10/23/19  1010 1

## 2019-10-25 NOTE — PROGRESS NOTES
[facility] Hospitalist Progress Note                                                                   [address]  9/20/1950    SUBJECTIVE:  Pt seen and examined.   States she fees a little b (10/23/19 6956)   • vasopressin (PITRESSIN) infusion for shock     • phenylephrine 45 mcg/min (10/25/19 5822)     PRN: ondansetron **OR** ondansetron HCl, Metoclopramide HCl **OR** Metoclopramide HCl, vasopressin (PITRESSIN) infusion for shock, morphINE acuna

## 2019-10-26 NOTE — PROGRESS NOTES
[address] Patient Status:  Inpatient    1950 MRN [id number]   [facility] 4SW-A Attending Maurice Stewart,*   Hosp Day # 3 PCP Shazia Sanchez MD     Critical Care Progress Note     S: Off pressors, off O 2315 99/64 — — (!) 125 (!) 31 93 % —   10/25/19 2300 101/69 — — 120 (!) 29 93 % —   10/25/19 2245 92/59 — — 120 (!) 30 94 % —   10/25/19 2230 93/61 — — (!) 126 (!) 32 94 % —   10/25/19 2215 93/59 — — (!) 121 25 93 % —   10/25/19 2200 102/68 — — (!) 126 Alie Manzo RBC 3.91 10/26/2019    HGB 10.2 10/26/2019    HCT 33.0 10/26/2019    MCV 84.4 10/26/2019    MCH 26.1 10/26/2019    MCHC 30.9 10/26/2019    RDW 17.0 10/26/2019    PLT 22.0 10/26/2019     Lab Results   Component Value Date     10/26/2019    K 4.1 10

## 2019-10-26 NOTE — PLAN OF CARE
Continues to be confused, doesn't recognize family consistently. Neuro consulted and orders for MRI in AM, EEG, and Thiamine IV/check B1 level obtained. If patient unable to lay still for MRI/poor images, okay to stop per Dr. Ronnie Forde.  Urine output low, obt

## 2019-10-26 NOTE — PLAN OF CARE
Assumed care of patient for the night shift. Patient resting in bed with family at bedside. Patient confused, A/O only to herself.  She can state her name and  but is unable to tell the date or even the current president and is unable to state where she

## 2019-10-26 NOTE — PROGRESS NOTES
[address] Patient Status:  Inpatient    1950 MRN [id number]   [facility] 4SW-A Attending Carito Lujan,*   Hosp Day # 3 PCP Donita Blizzard, MD     Interval History--  No acute events ov recently   • Personal history of antineoplastic chemotherapy     daily oral chemo   • Problems with swallowing     in last 10 days   • Pulmonary embolism [facility])      Past Surgical History:   Procedure Laterality Date   • BRONCH EBUS SAMPLNG 1/2 NODE Right 0 disintegrating tab 4 mg, 4 mg, Oral, Q6H PRN **OR** ondansetron HCl (ZOFRAN) injection 4 mg, 4 mg, Intravenous, Q6H PRN  •  Metoclopramide HCl (REGLAN) tab 10 mg, 10 mg, Oral, Q6H PRN **OR** Metoclopramide HCl (REGLAN) injection 10 mg, 10 mg, Intravenous, 130 10/26/2019    CA 9.0 10/26/2019    MG 2.1 10/26/2019       Imaging:  Ct Brain Or Head ([id number])    Result Date: 10/25/2019  CONCLUSION:  1.  Compared to most recent noncontrast CT of the brain performed 5/18/2019, interval decrease in size of previously d Impression and Plan:    Patient Active Problem List:     Dermatitis     Malignant neoplasm of upper lobe of right lung ([address]     Brain metastasis ([address]     Community acquired pneumonia of right lung     Hypoxia     Liver metastasis ([address]     Secondary

## 2019-10-26 NOTE — CONSULTS
Consulting Physician: Dr. Shiva Varghese    CC:  Confusion, AMS    HPI:  This is a 78year old female with stage IV NSCLC with metastatic disease to bone, brain, liver, and adrenal glands presenting for evaluation of confusion.   Apparently, for the last 2.5 weeks right lung   • OTHER SURGICAL HISTORY  11/2017    parathyroidectomy   • OTHER SURGICAL HISTORY Right 09/26/2018    endobronchial biopsy   • RIGHT LASER-ASSISTED CATARACT SURGERY WITH ARCUATE INCISIONS PHACOEMULSIFICATION WITH POSTERIOR CHAMBER LENS IMPLANT together: Not on file        Attends Ep[employer] service: Not on file        Active member of club or organization: Not on file        Attends meetings of clubs or organizations: Not on file        Relationship status: Not on file      Intimate partner viole would recommend starting Thiamine 100 mg IV daily    2. Septic Shock  - mgmt per ID/critical care    3.   Stage IV NSCLC with metastatic disease to the brain, bone, liver, and adrenal gland  - mgmt per oncology    Jatinder Zamudio MD

## 2019-10-26 NOTE — PROGRESS NOTES
[facility] Hospitalist Progress Note                                                                   [address]  9/20/1950    SUBJECTIVE:  Pt seen and examined.   She became confused and ag Intravenous Q6H     Continuous Infusions:   • sodium chloride 75 mL/hr at 10/26/19 6859   • DOPamine in D5W 10 mcg/kg/min (10/23/19 2468)   • vasopressin (PITRESSIN) infusion for shock     • phenylephrine Stopped (10/26/19 3321)     PRN: ondansetron **OR** Greater than 35 minutes spent on care of this complex patient, more than 50% face to face time discussing acute medical issues and discharge planning.       Ryan Pan DO  [facility] Hospitalist  Pager: [phone]

## 2019-10-27 NOTE — PROCEDURES
Clinical History:     EEG DESCRIPTION    LETHARGY  Posterior Dominant Rhythm is Absent  Beta: 15-25 Hz; 20 uV; frontocentral, symmetric, waxing and waning  Continuous slow, 2-3 Hz, generalized, irregular, polymorphic with overlying theta rhythms  Sharp Tra

## 2019-10-27 NOTE — DIETARY NOTE
[facility]    NUTRITION INITIAL ASSESSMENT    Pt does not meet malnutrition criteria.     NUTRITION DIAGNOSIS/PROBLEM:    Inadequate oral intake related to inability to consume sufficient energy due to acute illness as evidenced by estimated energy int 5.6 oz)  10/26/19 : 61.8 kg (136 lb 3.9 oz)  07/09/19 : 65.1 kg (143 lb 8 oz)  07/05/19 : 63.5 kg (140 lb)  06/27/19 : 64.9 kg (143 lb)  05/30/19 : 62.6 kg (138 lb)    NUTRITION:  Diet: General Diet with thin liquids   Oral Supplements: Ensure Enlive BID a

## 2019-10-27 NOTE — PROGRESS NOTES
[address] Patient Status:  Inpatient    1950 MRN [id number]   [facility] 4SW-A Attending Wu Iyerw,*   Hosp Day # 4 PCP Mike Michael MD     Interval History--  Worsening confusio daily oral chemo   • Problems with swallowing     in last 10 days   • Pulmonary embolism [facility])      Past Surgical History:   Procedure Laterality Date   • BRONCH EBUS SAMPLNG 1/2 NODE Right 09/26/2018    [address]R LN FNA   • CATARACT Bilateral 02/2017    IOL Oral, Q6H PRN **OR** ondansetron HCl (ZOFRAN) injection 4 mg, 4 mg, Intravenous, Q6H PRN  •  Metoclopramide HCl (REGLAN) tab 10 mg, 10 mg, Oral, Q6H PRN **OR** Metoclopramide HCl (REGLAN) injection 10 mg, 10 mg, Intravenous, Q6H PRN  •  Piperacillin Sod-Ta 10/27/2019     10/27/2019    CA 8.4 10/27/2019       Imaging:  Ct Brain Or Head ([id number])    Result Date: 10/25/2019  CONCLUSION:  1.  Compared to most recent noncontrast CT of the brain performed 5/18/2019, interval decrease in size of previously desc during acute events)  Per notes at [address] [place] [address], had recent progression and there has been consideration of changing therapy to chemo/immunotherapy soon. She was recently treated with focal radiation to liver and adrenal glands per her report/review of notes.   -

## 2019-10-27 NOTE — PROGRESS NOTES
[address] Patient Status:  Inpatient    1950 MRN [id number]   [facility] 4SW-A Attending Kehinde Gutierrez,*   [address] Day # 4 PCP Michelle Galeana MD     Critical Care Progress Note     S: No acute events ove 30 93 % —   10/25/19 2315 99/64 — — (!) 125 (!) 31 93 % —   10/25/19 2300 101/69 — — 120 (!) 29 93 % —   10/25/19 2245 92/59 — — 120 (!) 30 94 % —   10/25/19 2230 93/61 — — (!) 126 (!) 32 94 % —   10/25/19 2215 93/59 — — (!) 121 25 93 % —   10/25/19 2200 1 Component Value Date    WBC 12.9 10/27/2019    RBC 3.66 10/27/2019    HGB 9.4 10/27/2019    HCT 30.7 10/27/2019    MCV 83.9 10/27/2019    MCH 25.7 10/27/2019    MCHC 30.6 10/27/2019    RDW 17.1 10/27/2019    PLT 19.0 10/27/2019     Lab Results   Componen

## 2019-10-27 NOTE — PLAN OF CARE
Problem: Delirium  Goal: Minimize duration of delirium  Description  Interventions:  - Encourage use of hearing aids, eye glasses  - Promote highest level of mobility daily  - Provide frequent reorientation  - Promote wakefulness i.e. lights on, blinds o

## 2019-10-27 NOTE — PROGRESS NOTES
[facility] Hospitalist Progress Note                                                                   [address]  9/20/1950    SUBJECTIVE:  Pt seen and examined.  More confused overnight and • thiamine (VITAMIN B1) IVPB  100 mg Intravenous Q24H   • famoTIDine  20 mg Intravenous BID   • piperacillin-tazobactam  3.375 g Intravenous D6Q   • folic acid  352 mcg Oral Daily   • Sulfamethoxazole-TMP DS  1 tablet Oral Q MWF   • dexamethasone Sodium recs      # Hx of PE  # Thrombocytopenia  - lov on hold due to pltc < 50K  - transfuse platelets per hem/onc  - Heme consulted     # Stage IV NSCLC   # Brain metastases due to above   - Heme/Onc on consult  - Follows with Onc/Rad Onc out of [facility]    # Acute

## 2019-10-27 NOTE — PLAN OF CARE
Patient remained with decreased level of alertness. She is confused. She is able to tell name but unable to recognize spouse. She moves her extremities but generally weak. She remained tachycardic and tachypneic. O2 maintained. O2 sat >92%. Fuller intact.  Evander Harada safe patient handling equipment as needed  - Ensure adequate protection for wounds/incisions during mobilization  - Obtain PT/OT consults as needed  - Advance activity as appropriate  - Communicate ordered activity level and limitations with patient/family self-harm  Description  INTERVENTIONS:  - Apply the least restrictive restraint to prevent harm  - Notify patient and family of reasons restraints applied  - Assess for any contributing factors to confusion (electrolyte disturbances, delirium, medications)

## 2019-10-27 NOTE — PROGRESS NOTES
Subjective     Patient continues to have altered mental status; today she is more lethargic    • potassium chloride 40mEq IVPB (peripheral line)  40 mEq Intravenous Once   • melatonin  1 mg Oral Nightly   • thiamine (VITAMIN B1) IVPB  100 mg Intravenous Q2

## 2019-10-27 NOTE — PLAN OF CARE
Assumed care of patient at 13399 13 48 83. Monitor tele-ST,  on 2L, tachypnea. HOB elevated. IVF infusing. IV zosyn. Turn reposition. Pt drowsy, orientated to self, follows commands. Generalized weakness. Fuller draining. Labs in am. CC APN notified of plt.  Plan t

## 2019-10-27 NOTE — PROGRESS NOTES
SPEECH DAILY NOTE - INPATIENT    ASSESSMENT & PLAN   ASSESSMENT  The patient was seen for skilled speech therapy services as per plan of care target dysphagia treatment for skilled diet analysis to ensure toleration of recommended diet level.   BSE complete implementation of aspiration precautions and swallow strategies independently over 1-2 session(s).     Addressed        FOLLOW UP  Follow Up Needed: Yes  SLP Follow-up Date: 10/28/19  Number of Visits to Meet Established Goals: 2    Session: 1/2    If you

## 2019-10-28 PROBLEM — Z51.5 PALLIATIVE CARE ENCOUNTER: Status: ACTIVE | Noted: 2019-01-01

## 2019-10-28 PROBLEM — Z71.89 GOALS OF CARE, COUNSELING/DISCUSSION: Status: ACTIVE | Noted: 2019-01-01

## 2019-10-28 NOTE — PROGRESS NOTES
[facility] Hospitalist Progress Note     Maryann Rodriguez Patient Status:  Inpatient    1950 MRN [id number]   [facility] 4SW-A Attending Whit Hutchins MD   Hosp Day # 5 PCP Kristie Ayoub MD     CC: follow up    SUBJECTIVE:  Overnight ev 241*   ALB 2.0* 1.8* 1.8*       Recent Labs   Lab 10/27/19  2342 10/28/19  0600 10/28/19  1227   PGLU 131* 154* 184*       Imaging:  Mri Brain (cpt=70551)    Result Date: 10/27/2019  CONCLUSION:  4.0 x 2.3 cm area of restricted diffusion involving the post Intravenous Q12H   • Insulin Aspart Pen  1-5 Units Subcutaneous 4 times per day   • melatonin  1 mg Oral Nightly   • thiamine (VITAMIN B1) IVPB  100 mg Intravenous Q24H   • famoTIDine  20 mg Intravenous BID   • piperacillin-tazobactam  3.375 g Intravenous following    # AHRF  - in setting of PNA, metabolic acidosis  - on bipap, wean as able  - per pulm     # Dysphagia  - passed swallow eval  - keep NPO for now given mentation      # Hx of PE  # Thrombocytopenia  - lov on hold due to pltc < 50K  - transfuse

## 2019-10-28 NOTE — PROGRESS NOTES
Subjective     No overnight events    • atorvastatin  80 mg Oral Nightly   • dexamethasone Sodium Phosphate  4 mg Intravenous Q12H   • Insulin Aspart Pen  1-5 Units Subcutaneous 4 times per day   • melatonin  1 mg Oral Nightly   • thiamine (VITAMIN B1) IVP secondary to ischemic strokes  - as above    Aries Canales MD

## 2019-10-28 NOTE — PROGRESS NOTES
10/28/19 1801   Clinical Encounter Type   Visited With Family   Routine Visit Follow-up   Continue Visiting No   Crisis Visit Death   Patient's Supportive Strategies/Resources Many family at bedside. Family very loving and supporting of each other.    R

## 2019-10-28 NOTE — OCCUPATIONAL THERAPY NOTE
Patient being followed by OT. New orders received per stroke protocol d/t acute left parietal ischemic stroke found on MRI 10/27/19. Patient is somnolent, not following commands and requiring complex medical treatment.   Per chart, patient's family will be

## 2019-10-28 NOTE — PROGRESS NOTES
[facility]  Progress Note    [place] Patient Status:  Inpatient    1950 MRN [id number]   [facility] 4SW-A Attending Nelly Lomeli DO   Hosp Day # 5 PCP Aakash Ruggiero MD     Subjective:     On BIPAP  No responsive to Result Value Ref Range    Cholesterol, Total 129 <200 mg/dL    HDL Cholesterol 30 (L) 40 - 59 mg/dL    Triglycerides 204 (H) 30 - 149 mg/dL    LDL Cholesterol 58 <100 mg/dL    VLDL 41 (H) 0 - 30 mg/dL    Non HDL Chol 99 <130 mg/dL   COMP METABOLIC PANEL (L) 1.00 - 4.00 x10(3) uL    Monocyte Absolute 0.22 0.10 - 1.00 x10(3) uL    Eosinophil Absolute 0.00 0.00 - 0.70 x10(3) uL    Basophil Absolute 0.02 0.00 - 0.20 x10(3) uL    Immature Granulocyte Absolute 0.19 0.00 - 1.00 x10(3) uL    Neutrophil % 94.6 % - 450.0 10(3)uL    MCV 85.2 80.0 - 100.0 fL    MCH 26.3 26.0 - 34.0 pg    MCHC 30.9 (L) 31.0 - 37.0 g/dL    RDW 17.6 (H) 11.0 - 15.0 %    RDW-SD 53.5 (H) 35.1 - 46.3 fL    Neutrophil Absolute Prelim 12.47 (H) 1.50 - 7.70 x10 (3) uL    Neutrophil Absolute 1 Portable  (cpt=71045)    Result Date: 10/27/2019  CONCLUSION:  Dobbhoff tube in the stomach.     Dictated by: Janine Marquis MD on 10/27/2019 at 20:40     Approved by: Janine Marquis MD on 10/27/2019 at 20:41          Xr Chest Ap Portable  (cpt=71045)    Resu with the  and daughter and son in law  The overall prognosis is poor for lung cancer and now with the current clinical deterioration, I would recommend her to be DNR with supportive care only    After our discussion, they agreed with her being DNR.

## 2019-10-28 NOTE — CONSULTS
[facility] [address] [place] [address] Cardiology  Report of Consultation    [place] Patient Status:  Inpatient    1950 MRN [id number]   [facility] 4SW-A Attending Margarita Colmenares DO   Hosp Day # 5 PCP Javier Fam MD     Reason for [facility] hyperparathyroidism 10/10/2018   • Metastatic adenocarcinoma ([facility] [address] 09/26/2018    Right lung, Liver, Brain, Adrenal gland, Lumbar Vert   • Muscle weakness     3 falls recently   • Personal history of antineoplastic chemotherapy     daily oral chemo   • Prob tramadol-acetaminophen 37.5-325 MG Oral Tab, Take 1-2 tablets by mouth every 4 (four) hours as needed for Pain., Disp: , Rfl:   Enoxaparin Sodium (LOVENOX) 80 MG/0.8ML Subcutaneous Solution, Inject 0.7 mg into the skin every 12 (twelve) hours.   , Disp: , demonstrate 1+ pedal edema. No cyanosis or clubbing of the digits is appreciated. Femoral, Dorsalis Pedis, and Posterior Tibialis  pulses are 2+ and equal in a symmetric fashion. Neurologic: Lethargic  Integument:  No visible rashes are appreciated. H/O DVT / PE  7. Coagulopathy  8. Anemia  9. Severe thrombocytopenia   -Plts 18K  10. Respiratory insufficiency  11. Encephalopathy   -Metastatic disease   -CVA   -Toxic / Metabolic  12.   H/O DVT / PE   -Off anticoagulant 2/2 anemia / thrombocytopenia

## 2019-10-28 NOTE — CONSULTS
[address] [facility]  [id number]  Hospital Day #5  Date of Consult:   10/27/19        Reason for Consultation:      Consult requested by Ligia Tyler for evaluation of palliative care needs, goals Right 09/26/2018    [address]R LN FNA   • CATARACT Bilateral 02/2017    IOL   • COLONOSCOPY  1.13.15    Dr Nuris Chambers, sessile polyp, rpt 5 yrs   • COLONOSCOPY     • ENDOBRONCHIAL ULTRASOUND (EBUS) N/A 9/26/2018    Performed by Bri Macdonald MD at [facility] ENDOSCOPY   • L glucose (DEX4) oral liquid 30 g, 30 g, Oral, Q15 Min PRN **OR** Glucose-Vitamin C (DEX-4) chewable tab 8 tablet, 8 tablet, Oral, Q15 Min PRN  •  Insulin Aspart Pen (NOVOLOG) 100 UNIT/ML flexpen 1-5 Units, 1-5 Units, Subcutaneous, 4 times per day  •  acetam Oral, Q6H PRN  No current outpatient medications on file.       Labs/ imaging     Hematology:  Lab Results   Component Value Date    WBC 13.3 (H) 10/28/2019    HGB 9.8 (L) 10/28/2019    HCT 31.7 (L) 10/28/2019    PLT 51.0 (L) 10/28/2019       Coags:  Lab Re MD on 10/27/2019 at 20:40     Approved by: Cordell Rodríguez MD on 10/27/2019 at 20:41          Xr Chest Ap Portable  (cpt=71045)    Result Date: 10/27/2019  CONCLUSION:  Enteric tube tip in stomach.   Airspace disease and effusion left lung base with elevation do any work Mainly Assist Normal or Reduced Full or confused   30 Bedbound Extensive Disease  Can’t do any work Max Assist  Total Care Reduced Drowsy/confused   20 Bedbound Extensive Disease  Can’t do any work Max Assist  Total Care Minimal Drowsy/confused Hopes/Goals:Hope is for Liliana Arthur to have a meaningful recovery, although they understand this not medically possible. The new hope is for Liliana Arthur to be comfortable. Concerns/Fears: Loosing their mother.  Omayra Yeung, the son is in [place] he would like to see his I mentioned several times in answer to their questions of how long will she last once the medications infusining are stopped?, \" we can not predict\" I mentioned again her son in [place] may not have the opportunity to see her alive if we start comfort care Emotional support provided to the family. - Disposition: hospital death    - Discussed today’s visit with Michele Camacho and Bienvenido RNs. Dr. Lori Doherty and Dr. Mylene Lo notified of comfort care plan.      Thank you for allowing Palliative Care services to parti

## 2019-10-28 NOTE — PHYSICAL THERAPY NOTE
Order received for Physical Therapy services. Chart reviewed and spoke with RN patient not medically appropriate for PT services this date. Will check status and proceed with PT services as appropriate.

## 2019-10-28 NOTE — SLP NOTE
New orders received per CVA protocol. Pt not appropriate for reevaluation at this time given respiratory status per RN. Will follow up in 1-2 days or as clinically appropriate. RN aware. Thank you.     Kishor Anand M.S. [phone] [facility]  Pager [phone]

## 2019-10-28 NOTE — PLAN OF CARE
Assumed care of pt at 0730. Palliative care consulted, pt family all at bedside. Decision made to initiate comfort care. 2 mg morphine given for comfort at 1705. Vasopressor removed shortly after. Palliative care APN at bedside.  Time of death confirmed at

## 2019-10-28 NOTE — PROGRESS NOTES
10/28/19 0912   Clinical Encounter Type   Referral To Nurse  (Referral made to Father Shashair Dexter for visit as requested. )

## 2019-10-28 NOTE — PROGRESS NOTES
Pulmonary Progress Note        NAME: Leesa Cleaning - ROOM: [address] [place] [address]-[place] - MRN: [id number] - Age: 78year old - : 1950        Last 24hrs: Overnight events reviewed    OBJECTIVE:   10/28/19  0645 10/28/19  0700 10/28/19  0800 10/28/19  0900   BP: Arnoldo Manzo 9. 7* 9.8*   MCV 84.4 83.9  --  83.3 85.2   PLT 22.0* 19.0*  --  18.0* 51.0*   INR  --   --  2.14*  --   --        Recent Labs     10/26/19  0411 10/27/19  0424 10/27/19  0621 10/28/19  0508     136 136 138 137   K 4.1  4.1 3.6 4.2  4.2 4.5    onc, follows with Dr. Angelina Hairston at [facility]  7. Encephalopathy:  Suspect due to Mult Strokes, TME and brain mets  -supportive care  - neuro following  7. FEN  - PO as tolerated  8. Proph  - scds  9.  Dispo  -DNR  -palliative care  -discussed w/ family at bedside

## 2019-10-28 NOTE — PLAN OF CARE
Received patient with HR in 140's and RR of mids 30's, pt would open eyes to name and turn head towards stimulus. Pt withdrawals to pain on all 4 extremities. Family at bedside waiting for .  alexsandra Walsh, was called about increased HR and increased R ordered  Outcome: Progressing     Problem: Safety Risk - Non-Violent Restraints  Goal: Patient will remain free from self-harm  Description  INTERVENTIONS:  - Apply the least restrictive restraint to prevent harm  - Notify patient and family of reasons res

## 2019-10-28 NOTE — PROGRESS NOTES
[address] Patient Status:  Inpatient    1950 MRN [id number]   [facility] 4SW-A Attending Ivone Contreras, DO   Hosp Day # 5 PCP Harris Varela MD     Critical Care Progress Note     S: Pt more somnolent.   Kell El 10/27/19 1530 (!) 123/91 — — (!) 141 (!) 30 97 %   10/27/19 1500 (!) 88/43 — — (!) 141 23 94 %   10/27/19 1400 98/83 — — (!) 141 (!) 27 96 %   10/27/19 1300 104/75 — — (!) 137 (!) 27 96 %   10/27/19 1200 (!) 119/101 98.6 °F (37 °C) Temporal (!) 140 (!) 3 10/27/2019    K 4.2 10/27/2019     10/27/2019    CO2 23.0 10/27/2019    BUN 16 10/27/2019    CREATSERUM 0.38 10/27/2019     10/27/2019    CA 8.7 10/27/2019    ALKPHO 494 10/27/2019     10/27/2019     10/27/2019    BILT 0.6 10/27/ further testing ordered. Discussed patient's poor prognosis and possibility of decompensation even overnight. Expressed worry about potential for intubation given patient's mental status and snoring breathing.   Discussed code status with family and asked

## 2019-10-28 NOTE — PROGRESS NOTES
[facility]  Progress Note    Shola Lund Patient Status:  Inpatient    1950 MRN [id number]   [facility] 4SW-A Attending Ginger Quinones DO   Hosp Day # 5 PCP Yassine Urbano MD     Subjective:   Less respons Output 775 ml   Net 1940 ml       Wt Readings from Last 3 Encounters:  10/27/19 : 137 lb 5.6 oz (62.3 kg)  10/26/19 : 136 lb 3.9 oz (61.8 kg)  07/09/19 : 143 lb 8 oz (65.1 kg)      Allergies:    Codeine                 NAUSEA AND VOMITING    Physical Exa interval not displayed.        Recent Labs   Lab 10/27/19  0424 10/27/19  2301 10/28/19  0508   RBC 3.66* 3.78* 3.72*   HGB 9.4* 9.7* 9.8*   HCT 30.7* 31.5* 31.7*   MCV 83.9 83.3 85.2   MCH 25.7* 25.7* 26.3   MCHC 30.6* 30.8* 30.9*   RDW 17.1* 17.5* 17.6* support with increasing requirements  5. Acute CVA on MRI  6. H/O DVT / PE  7. Coagulopathy  8. Anemia  9. Severe thrombocytopenia               -Plts improved following transfusion  10. Respiratory insufficiency  11.   Encephalopathy               -M

## 2019-10-28 NOTE — PROGRESS NOTES
10/27/19 2032   Clinical Encounter Type   Visited With Patient and family together;Health care provider  (MARTHA Shah)   Routine Visit Introduction   Continue Visiting Yes  (Check in with RN.   Family likes prayer )   Crisis Visit Critical care   Patien

## 2019-10-29 NOTE — PLAN OF CARE
Postmortem care done per protocol. Body transported to [facility] with required paperwork.

## 2019-11-15 NOTE — DISCHARGE SUMMARY
General Medicine Death Summary     Patient ID:  Wendy Patella  78year old  9/20/1950    Admit date: 10/23/2019    Death date: 10/28/2019     Attending Physician: No att. providers found     Primary Care Physician: Sherley Underwood MD     Reason for Admiss out of [facility]     # Acute urinary retention   - s/p sinclair  - voiding trial when able      # [address]  - Oncology rec supportive care only. Pall care consulted. DNR. Palliative care met with family and they opted for transition to [address] [place] [address]  Patient passed on 10/28

## 2020-01-27 NOTE — PROGRESS NOTES
Received patient at [facility] and oriented  No complaints of pain  NSR on monitor  1 liter nasal canula with sats 92-94%  + scd's  Up with standby assist  Report called to Jocelyne Varma patient to transfer to [facility]    Cat scan disc requested from radiology. No

## 2023-08-09 NOTE — CONSULTS
Pulmonary / Critical Care H&P/Consult       NAME: Frankie 6199: C6/C6 - MRN: [id number] - Age: 70year old - :  1950    Date of Admission: 2019 11:15 AM  Admission Diagnosis: No admission diagnoses are documented for this encounter. file      Number of children: Not on file      Years of education: Not on file      Highest education level: Not on file    Occupational History      Not on file    Social Needs      Financial resource strain: Not on file      Food insecurity:        Worry Subcutaneous Solution Inject 0.7 mg into the skin every 12 (twelve) hours. Disp:  Rfl:    folic acid 980 MCG Oral Tab Take 400 mcg by mouth daily. Disp:  Rfl:    Osimertinib Mesylate 80 MG Oral Tab Take 80 mg by mouth daily.  Disp:  Rfl: 0   dexamethasone 2 trachea midline, no adenopathy;        thyroid:  No enlargement/tenderness/nodules; no carotid    bruit or JVD   Back:     Symmetric, no curvature, ROM normal, no CVA tenderness   Lungs:     Clear to auscultation bilaterally, respirations unlabored   Chest weak suck to straw

## (undated) DEVICE — SPONGE: SPECIALTY PEANUT XR 100/CS: Brand: MEDICAL ACTION INDUSTRIES

## (undated) DEVICE — MEDI-VAC SUCTION HANDLE REGULAR CAPACITY: Brand: CARDINAL HEALTH

## (undated) DEVICE — REM POLYHESIVE ADULT PATIENT RETURN ELECTRODE: Brand: VALLEYLAB

## (undated) DEVICE — 3M™ STERI-STRIP™ REINFORCED ADHESIVE SKIN CLOSURES, R1547, 1/2 IN X 4 IN (12 MM X 100 MM), 6 STRIPS/ENVELOPE: Brand: 3M™ STERI-STRIP™

## (undated) DEVICE — SINGLE USE BIOPSY VALVE MAJ-210: Brand: SINGLE USE BIOPSY VALVE (STERILE)

## (undated) DEVICE — MEDI-VAC NON-CONDUCTIVE SUCTION TUBING: Brand: CARDINAL HEALTH

## (undated) DEVICE — GAUZE SPONGES,USP TYPE VII GAUZE, 12 PLY: Brand: CURITY

## (undated) DEVICE — MASK ISOLATION

## (undated) DEVICE — GOWN,SIRUS,FABRIC-REINFORCED,LARGE: Brand: MEDLINE

## (undated) DEVICE — SYRINGE 10ML SLIP TIP

## (undated) DEVICE — FLUIDGARD® 160 ANTI-FOG SURGICAL MASK WITH ANTI-GLARE SHIELD: Brand: PRECEPT ®

## (undated) DEVICE — GLOVE BIOGEL M SURG SZ 6-1/2

## (undated) DEVICE — SOL  .9 1000ML BTL

## (undated) DEVICE — CHLORAPREP ORANGE TINT 10.5ML

## (undated) DEVICE — GLOVE SURG SENSICARE SZ 7

## (undated) DEVICE — 60 ML SYRINGE REGULAR TIP: Brand: MONOJECT

## (undated) DEVICE — SUTURE SILK 2-0

## (undated) DEVICE — 3M(TM) MICROPORE TAPE DISPENSER 1535-2: Brand: 3M™ MICROPORE™

## (undated) DEVICE — ENDOSCOPY PACK UPPER: Brand: MEDLINE INDUSTRIES, INC.

## (undated) DEVICE — FILTERLINE NASAL ADULT O2/CO2

## (undated) DEVICE — ABSORBABLE HEMOSTAT (OXIDIZED REGENERATED CELLULOSE, U.S.P.): Brand: SURGICEL

## (undated) DEVICE — KENDALL SCD EXPRESS SLEEVES, KNEE LENGTH, MEDIUM: Brand: KENDALL SCD

## (undated) DEVICE — NEEDLE ASP VIZISHOT 22GA 1.8MM

## (undated) DEVICE — MINI LAP PACK-LF: Brand: MEDLINE INDUSTRIES, INC.

## (undated) DEVICE — LIGACLIP EXTRA LIGATING CLIP CARTRIDGES: 6 TITANIUM CLIPS/ CARTRIDGE (SMALL): Brand: LIGACLIP

## (undated) DEVICE — BOWLS UTILITY 16OZ

## (undated) DEVICE — FORCEPS BX 100CM 1.8MM RJ STD

## (undated) DEVICE — 1200CC GUARDIAN II: Brand: GUARDIAN

## (undated) DEVICE — LENS PLASTIC DISP EYEWEAR

## (undated) DEVICE — LENS FRAMES DISP EYEWEAR

## (undated) DEVICE — CAUTERY NEEDLE 2IN INS E1465

## (undated) DEVICE — SUTURE MONOCRYL 4-0 PS-2

## (undated) DEVICE — SINGLE USE SUCTION VALVE MAJ-209: Brand: SINGLE USE SUCTION VALVE (STERILE)

## (undated) DEVICE — SUTURE VICRYL 3-0 SH

## (undated) DEVICE — PROBE 8225101 5PK STD PRASS FL TIP ROHS

## (undated) DEVICE — 3M™ RED DOT™ MONITORING ELECTRODE WITH FOAM TAPE AND STICKY GEL, 50/BAG, 20/CASE, 72/PLT 2570: Brand: RED DOT™

## (undated) NOTE — ED AVS SNAPSHOT
Jesi Drummond   MRN: [id number]    Department:  [facility] Emergency Department   Date of Visit:  5/10/2019           Disclosure     Insurance plans vary and the physician(s) referred by the ER may not be covered by your plan.  Please contact y tell this physician (or your personal doctor if your instructions are to return to your personal doctor) about any new or lasting problems. The primary care or specialist physician will see patients referred from the [facility] Emergency Department.  Anson Montilla

## (undated) NOTE — LETTER
[facility]  Eleanor Michellemariya 60 [id number] [facility], [address] [place] [address]    Consent for Operation    Date: __________________    Time: _______________    1.  I authorize the performance upon Tomer Lawson the following operation:    Procedure(s):  PARATHYROIDECTOMY revealed by the pictures or by descriptive texts accompanying them. If the procedure has been videotaped, the surgeon will obtain the original videotape. The hospital will not be responsible for storage or maintenance of this tape.     6. For the purpose of THAT MY DOCTOR PROVIDED ME WITH THE ABOVE EXPLANATIONS, THAT ALL BLANKS OR STATEMENTS REQUIRING INSERTION OR COMPLETION WERE FILLED IN.     Signature of Patient:   ___________________________    When the patient is a minor or mentally incompetent to give co · All of the medicines I take (including prescriptions, herbal supplements, and pills I can buy without a prescription (including street drugs/illegal medications).  Failure to inform my anesthesiologist about these medicines may increase my risk of anesthe _____________________________________________________________________________  Anesthesiologist Signature     Date   Time  I have discussed the procedure and information above with the patient (or patient’s representative) and answered their questions.  The

## (undated) NOTE — LETTER
[facility]  Eleanor Hanna 60 [address] [facility], [address] [place] [address]    Consent for Operation    Date: __________________    Time: _______________    1.  I authorize the performance upon Samm Lee the following operation:    Procedure(s):  ENDOBRONCHIAL ULTR videotape. The hospitals will not be responsible for storage or maintenance of this tape. 6. For the purpose of advancing medical education, I consent to the admittance of observers to the Operating Room.     7. I authorize the use of any specimen, organs Signature of Patient:   ___________________________    When the patient is a minor or mentally incompetent to give consent:  Signature of person authorized to consent for patient: ___________________________   Relationship to patient: _____________________ drugs/illegal medications). Failure to inform my anesthesiologist about these medicines may increase my risk of anesthetic complications. · If I am allergic to anything or have had a reaction to anesthesia before.     3. I understand how the anesthesia med I have discussed the procedure and information above with the patient (or patient’s representative) and answered their questions. The patient or their representative has agreed to have anesthesia services.     _______________________________________________